# Patient Record
Sex: MALE | Race: WHITE | NOT HISPANIC OR LATINO | Employment: OTHER | URBAN - METROPOLITAN AREA
[De-identification: names, ages, dates, MRNs, and addresses within clinical notes are randomized per-mention and may not be internally consistent; named-entity substitution may affect disease eponyms.]

---

## 2020-01-17 ENCOUNTER — OFFICE VISIT (OUTPATIENT)
Dept: FAMILY MEDICINE CLINIC | Facility: CLINIC | Age: 75
End: 2020-01-17

## 2020-01-17 VITALS
WEIGHT: 138.8 LBS | OXYGEN SATURATION: 98 % | BODY MASS INDEX: 19.43 KG/M2 | SYSTOLIC BLOOD PRESSURE: 138 MMHG | TEMPERATURE: 97.6 F | DIASTOLIC BLOOD PRESSURE: 78 MMHG | HEIGHT: 71 IN | HEART RATE: 64 BPM

## 2020-01-17 DIAGNOSIS — R79.9 ABNORMAL FINDING OF BLOOD CHEMISTRY, UNSPECIFIED: ICD-10-CM

## 2020-01-17 DIAGNOSIS — E55.9 VITAMIN D DEFICIENCY, UNSPECIFIED: ICD-10-CM

## 2020-01-17 DIAGNOSIS — Z00.00 MEDICARE ANNUAL WELLNESS VISIT, SUBSEQUENT: Primary | ICD-10-CM

## 2020-01-17 DIAGNOSIS — Z12.5 ENCOUNTER FOR SCREENING FOR MALIGNANT NEOPLASM OF PROSTATE: ICD-10-CM

## 2020-01-17 DIAGNOSIS — C80.1 CANCER (HCC): ICD-10-CM

## 2020-01-17 DIAGNOSIS — F41.9 ANXIETY: ICD-10-CM

## 2020-01-17 DIAGNOSIS — M19.90 ARTHRITIS: ICD-10-CM

## 2020-01-17 LAB
25(OH)D3 SERPL-MCNC: 47.3 NG/ML (ref 30–100)
ALBUMIN SERPL-MCNC: 4 G/DL (ref 3.5–5.2)
ALBUMIN/GLOB SERPL: 1.4 G/DL
ALP SERPL-CCNC: 68 U/L (ref 39–117)
ALT SERPL W P-5'-P-CCNC: 14 U/L (ref 1–41)
ANION GAP SERPL CALCULATED.3IONS-SCNC: 10.8 MMOL/L (ref 5–15)
AST SERPL-CCNC: 13 U/L (ref 1–40)
BILIRUB SERPL-MCNC: 0.4 MG/DL (ref 0.2–1.2)
BUN BLD-MCNC: 20 MG/DL (ref 8–23)
BUN/CREAT SERPL: 25 (ref 7–25)
CALCIUM SPEC-SCNC: 9.3 MG/DL (ref 8.6–10.5)
CHLORIDE SERPL-SCNC: 102 MMOL/L (ref 98–107)
CHOLEST SERPL-MCNC: 161 MG/DL (ref 0–200)
CHROMATIN AB SERPL-ACNC: 15.2 IU/ML (ref 0–14)
CO2 SERPL-SCNC: 25.2 MMOL/L (ref 22–29)
CREAT BLD-MCNC: 0.8 MG/DL (ref 0.76–1.27)
CRP SERPL-MCNC: 0.05 MG/DL (ref 0–0.5)
DEPRECATED RDW RBC AUTO: 43.3 FL (ref 37–54)
ERYTHROCYTE [DISTWIDTH] IN BLOOD BY AUTOMATED COUNT: 12.7 % (ref 12.3–15.4)
GFR SERPL CREATININE-BSD FRML MDRD: 95 ML/MIN/1.73
GLOBULIN UR ELPH-MCNC: 2.8 GM/DL
GLUCOSE BLD-MCNC: 87 MG/DL (ref 65–99)
HCT VFR BLD AUTO: 34.5 % (ref 37.5–51)
HDLC SERPL-MCNC: 55 MG/DL (ref 40–60)
HGB BLD-MCNC: 11.5 G/DL (ref 13–17.7)
LDLC SERPL CALC-MCNC: 89 MG/DL (ref 0–100)
LDLC/HDLC SERPL: 1.61 {RATIO}
MCH RBC QN AUTO: 31.3 PG (ref 26.6–33)
MCHC RBC AUTO-ENTMCNC: 33.3 G/DL (ref 31.5–35.7)
MCV RBC AUTO: 93.8 FL (ref 79–97)
PLATELET # BLD AUTO: 366 10*3/MM3 (ref 140–450)
PMV BLD AUTO: 9.9 FL (ref 6–12)
POTASSIUM BLD-SCNC: 4.7 MMOL/L (ref 3.5–5.2)
PROT SERPL-MCNC: 6.8 G/DL (ref 6–8.5)
PSA SERPL-MCNC: 0.02 NG/ML (ref 0–4)
RBC # BLD AUTO: 3.68 10*6/MM3 (ref 4.14–5.8)
SODIUM BLD-SCNC: 138 MMOL/L (ref 136–145)
TRIGL SERPL-MCNC: 87 MG/DL (ref 0–150)
VLDLC SERPL-MCNC: 17.4 MG/DL (ref 5–40)
WBC NRBC COR # BLD: 6 10*3/MM3 (ref 3.4–10.8)

## 2020-01-17 PROCEDURE — 82306 VITAMIN D 25 HYDROXY: CPT | Performed by: INTERNAL MEDICINE

## 2020-01-17 PROCEDURE — G0103 PSA SCREENING: HCPCS | Performed by: INTERNAL MEDICINE

## 2020-01-17 PROCEDURE — 80061 LIPID PANEL: CPT | Performed by: INTERNAL MEDICINE

## 2020-01-17 PROCEDURE — 80053 COMPREHEN METABOLIC PANEL: CPT | Performed by: INTERNAL MEDICINE

## 2020-01-17 PROCEDURE — 86140 C-REACTIVE PROTEIN: CPT | Performed by: INTERNAL MEDICINE

## 2020-01-17 PROCEDURE — 99214 OFFICE O/P EST MOD 30 MIN: CPT | Performed by: INTERNAL MEDICINE

## 2020-01-17 PROCEDURE — 85027 COMPLETE CBC AUTOMATED: CPT | Performed by: INTERNAL MEDICINE

## 2020-01-17 PROCEDURE — 86431 RHEUMATOID FACTOR QUANT: CPT | Performed by: INTERNAL MEDICINE

## 2020-01-17 PROCEDURE — 36415 COLL VENOUS BLD VENIPUNCTURE: CPT | Performed by: INTERNAL MEDICINE

## 2020-01-17 PROCEDURE — G0439 PPPS, SUBSEQ VISIT: HCPCS | Performed by: INTERNAL MEDICINE

## 2020-01-17 RX ORDER — ALPRAZOLAM 1 MG/1
1 TABLET ORAL 3 TIMES DAILY PRN
COMMUNITY
End: 2020-05-14 | Stop reason: SDUPTHER

## 2020-01-17 RX ORDER — MELOXICAM 15 MG/1
15 TABLET ORAL DAILY
COMMUNITY
End: 2020-10-30 | Stop reason: SDUPTHER

## 2020-01-17 RX ORDER — SIMVASTATIN 20 MG
20 TABLET ORAL NIGHTLY
COMMUNITY
End: 2020-10-30 | Stop reason: SDUPTHER

## 2020-01-17 RX ORDER — TRIAMCINOLONE ACETONIDE 1 MG/G
CREAM TOPICAL 2 TIMES DAILY
COMMUNITY
End: 2022-02-22 | Stop reason: SDUPTHER

## 2020-01-17 NOTE — PATIENT INSTRUCTIONS
Medicare Wellness  Personal Prevention Plan of Service     Date of Office Visit:  2020  Encounter Provider:  Frank Madison MD  Place of Service:  [facility] AND INTER[facility]  Patient Name: Chaka Paul  :  1945    As part of the Medicare Wellness portion of your visit today, we are providing you with this personalized preventive plan of services (PPPS). This plan is based upon recommendations of the [place] Preventive Services Task Force (USPSTF) and the Advisory Committee on Immunization Practices (ACIP).    This lists the preventive care services that should be considered, and provides dates of when you are due. Items listed as completed are up-to-date and do not require any further intervention.    Health Maintenance   Topic Date Due   • TDAP/TD VACCINES (1 - Tdap) 1958   • ZOSTER VACCINE (1 of 2) 1997   • Pneumococcal Vaccine Once at 65 Years Old  2012   • INFLUENZA VACCINE  2019   • HEPATITIS C SCREENING  2020   • AAA SCREEN (ONE-TIME)  2020   • MEDICARE ANNUAL WELLNESS  2021   • COLONOSCOPY  2023       No orders of the defined types were placed in this encounter.      No follow-ups on file.

## 2020-01-17 NOTE — PROGRESS NOTES
Subsequent Medicare Wellness Visit   The ABC's of the Annual Wellness Visit    Chief Complaint   Patient presents with   • new pcp       HPI:  Chaka Paul, -1945, is a 74 y.o. male who presents for a Subsequent Medicare Wellness Visit.  Patient was seen for a Medicare wellness exam.  Patient was seen for anxiety.  Patient's anxiety is being controlled with his benzodiazepine.  Patient has a history of prostate cancer and his wife has recently developed cancer also.  Patient's anxiety is stable on medication.  Patient also has a low vitamin D level and was advised to take supplemental over-the-counter vitamin D 2000 units daily.  Patient does have arthritis in both hands and feet rheumatoid levels were drawn today.  Patient did have labs done and will follow-up in 2 weeks.    Dictated utilizing Dragon dictation. If there are questions or for further clarification, please contact me.  Recent Hospitalizations:  No hospitalization(s) within the last year..    Current Medical Providers:  Patient Care Team:  Frank Madison MD as PCP - General (Internal Medicine)    Health Habits and Functional and Cognitive Screening and Depression Screening:  Functional & Cognitive Status 2020   Do you have difficulty preparing food and eating? No   Do you have difficulty bathing yourself, getting dressed or grooming yourself? No   Do you have difficulty using the toilet? No   Do you have difficulty moving around from place to place? No   Do you have trouble with steps or getting out of a bed or a chair? No   Current Diet Well Balanced Diet   Dental Exam Up to date   Eye Exam Up to date   Exercise (times per week) 7 times per week   Current Exercise Activities Include Aerobics   Do you need help using the phone?  No   Are you deaf or do you have serious difficulty hearing?  No   Do you need help with transportation? No   Do you need help shopping? No   Do you need help preparing meals?  No   Do you need help with  housework?  No   Do you need help with laundry? No   Do you need help taking your medications? No   Do you need help managing money? No   Do you ever drive or ride in a car without wearing a seat belt? No   Have you felt unusual stress, anger or loneliness in the last month? No   Who do you live with? Spouse   If you need help, do you have trouble finding someone available to you? No   Have you been bothered in the last four weeks by sexual problems? No   Do you have difficulty concentrating, remembering or making decisions? No       Compared to one year ago, the patient feels his physical health is the same and his mental health is the same.    Depression Screen:  PHQ-2/PHQ-9 Depression Screening 1/17/2020   Little interest or pleasure in doing things 0   Feeling down, depressed, or hopeless 0   Trouble falling or staying asleep, or sleeping too much 0   Feeling tired or having little energy 0   Poor appetite or overeating 0   Feeling bad about yourself - or that you are a failure or have let yourself or your family down 0   Trouble concentrating on things, such as reading the newspaper or watching television 0   Moving or speaking so slowly that other people could have noticed. Or the opposite - being so fidgety or restless that you have been moving around a lot more than usual 0   Thoughts that you would be better off dead, or of hurting yourself in some way 0   Total Score 0   If you checked off any problems, how difficult have these problems made it for you to do your work, take care of things at home, or get along with other people? Not difficult at all         Past Medical/Family/Social History:  The following portions of the patient's history were reviewed and updated as appropriate: allergies, current medications, past family history, past medical history, past social history, past surgical history and problem list.    No Known Allergies      Current Outpatient Medications:   •  ALPRAZolam (XANAX) 1 MG  tablet, Take 1 mg by mouth 3 (Three) Times a Day As Needed for Anxiety., Disp: , Rfl:   •  meloxicam (MOBIC) 15 MG tablet, Take 15 mg by mouth Daily., Disp: , Rfl:   •  simvastatin (ZOCOR) 20 MG tablet, Take 20 mg by mouth Every Night., Disp: , Rfl:   •  triamcinolone (KENALOG) 0.1 % cream, Apply  topically to the appropriate area as directed 2 (Two) Times a Day. Rash on arms legs as needed twice daily, Disp: , Rfl:     Aspirin use counseling: Does not need ASA (and currently is not on it)    Current medication list contains no high risk medications.  No harmful drug interactions have been identified.     Family History   Problem Relation Age of Onset   • Dementia Mother    • Other Mother    • Cancer Father    • Blindness Sister    • No Known Problems Brother    • Seizures Sister    • Blindness Brother    • No Known Problems Brother        Social History     Tobacco Use   • Smoking status: Former Smoker   • Tobacco comment: quit 6 years ago november   Substance Use Topics   • Alcohol use: Not Currently     Binge frequency: Never       Past Surgical History:   Procedure Laterality Date   • PROSTATE SURGERY     • TONSILLECTOMY         Patient Active Problem List   Diagnosis   • Visual impairment   • History of medical problems   • Cancer (CMS/HCC)   • Atelectasis   • Arthritis   • Anxiety       Review of Systems   Constitutional: Negative for fatigue and fever.   HENT: Positive for congestion. Negative for trouble swallowing.    Eyes: Negative for discharge and visual disturbance.   Respiratory: Negative for choking and shortness of breath.    Cardiovascular: Negative for chest pain and palpitations.   Gastrointestinal: Negative for abdominal pain and blood in stool.   Endocrine: Negative.    Genitourinary: Negative for genital sores and hematuria.   Musculoskeletal: Negative for gait problem and joint swelling.   Skin: Negative for color change, pallor, rash and wound.   Allergic/Immunologic: Positive for  "environmental allergies. Negative for immunocompromised state.   Neurological: Negative for facial asymmetry and speech difficulty.   Psychiatric/Behavioral: Negative for hallucinations and suicidal ideas. The patient is nervous/anxious.        Objective     Vitals:    01/17/20 1008   BP: 138/78   Pulse: 64   Temp: 97.6 °F (36.4 °C)   SpO2: 98%   Weight: 63 kg (138 lb 12.8 oz)   Height: 180.3 cm (71\")       Patient's Body mass index is 19.36 kg/m². BMI is within normal parameters. No follow-up required..      No exam data present    The patient has no evidence of cognitve impairment.     Physical Exam   Constitutional: He appears well-developed and well-nourished.   HENT:   Head: Normocephalic.   Right Ear: External ear normal.   Left Ear: External ear normal.   Nose: Nose normal.   Mouth/Throat: Oropharynx is clear and moist.   Eyes: Pupils are equal, round, and reactive to light. Conjunctivae and EOM are normal.   Neck: Normal range of motion. Neck supple.   Cardiovascular: Normal rate, regular rhythm, normal heart sounds and intact distal pulses.   Pulmonary/Chest: Breath sounds normal.   Abdominal: Soft. Bowel sounds are normal.   Genitourinary: Rectum normal, prostate normal and penis normal.   Musculoskeletal: Normal range of motion.   Neurological: He is alert.   Skin: Skin is warm and dry.   Psychiatric: His behavior is normal. Judgment and thought content normal.   Patient has a moderate level of anxiety well controlled with medication       Recent Lab Results:          Assessment/Plan   Age-appropriate Screening Schedule:  Refer to the list below for future screening recommendations based on patient's age, sex and/or medical conditions.      Health Maintenance   Topic Date Due   • TDAP/TD VACCINES (1 - Tdap) 02/28/1958   • ZOSTER VACCINE (1 of 2) 02/28/1997   • INFLUENZA VACCINE  08/01/2019   • COLONOSCOPY  09/05/2023       Medicare Risks and Personalized Health Plan:  Advance Directive " Discussion      CMS-Preventive Services Quick Reference  Medicare Preventive Services Addressed:  NA    Advance Care Planning: #1 labs #2 vitamin D 2000 units daily #3 follow-up in 2 weeks for check blood pressure at home and bring to clinic  Patient does not have an advance directive - information provided to the patient today    Diagnoses and all orders for this visit:    1. Medicare annual wellness visit, subsequent (Primary)    2. Arthritis  -     CBC (No Diff)  -     Comprehensive Metabolic Panel  -     Lipid Panel  -     Vitamin D 25 Hydroxy  -     PSA Screen  -     KISHORE  -     C-reactive Protein  -     Rheumatoid Factor    3. Cancer (CMS/HCC)  -     CBC (No Diff)  -     Comprehensive Metabolic Panel  -     Lipid Panel  -     Vitamin D 25 Hydroxy  -     PSA Screen    4. Anxiety  -     CBC (No Diff)  -     Comprehensive Metabolic Panel  -     Lipid Panel  -     Vitamin D 25 Hydroxy  -     PSA Screen    5. Abnormal finding of blood chemistry, unspecified   -     Lipid Panel    6. Vitamin D deficiency, unspecified   -     Vitamin D 25 Hydroxy    7. Encounter for screening for malignant neoplasm of prostate   -     PSA Screen        An After Visit Summary and PPPS with all of these plans were given to the patient.      Follow Up:  Return in about 2 weeks (around 1/31/2020), or if symptoms worsen or fail to improve, for Recheck.

## 2020-01-20 DIAGNOSIS — M19.90 ARTHRITIS: Primary | ICD-10-CM

## 2020-01-20 LAB — ANA SER QL: NEGATIVE

## 2020-01-31 ENCOUNTER — OFFICE VISIT (OUTPATIENT)
Dept: FAMILY MEDICINE CLINIC | Facility: CLINIC | Age: 75
End: 2020-01-31

## 2020-01-31 VITALS
SYSTOLIC BLOOD PRESSURE: 138 MMHG | HEART RATE: 70 BPM | OXYGEN SATURATION: 97 % | BODY MASS INDEX: 19.29 KG/M2 | DIASTOLIC BLOOD PRESSURE: 68 MMHG | HEIGHT: 71 IN | WEIGHT: 137.8 LBS | TEMPERATURE: 97.5 F

## 2020-01-31 DIAGNOSIS — J98.11 ATELECTASIS: ICD-10-CM

## 2020-01-31 DIAGNOSIS — C80.1 CANCER (HCC): ICD-10-CM

## 2020-01-31 DIAGNOSIS — M19.90 ARTHRITIS: Primary | ICD-10-CM

## 2020-05-14 RX ORDER — ALPRAZOLAM 1 MG/1
1 TABLET ORAL 3 TIMES DAILY PRN
Qty: 90 TABLET | Refills: 3 | Status: SHIPPED | OUTPATIENT
Start: 2020-05-14 | End: 2020-09-14 | Stop reason: SDUPTHER

## 2020-07-24 ENCOUNTER — LAB (OUTPATIENT)
Dept: FAMILY MEDICINE CLINIC | Facility: CLINIC | Age: 75
End: 2020-07-24

## 2020-07-24 DIAGNOSIS — E55.9 VITAMIN D DEFICIENCY, UNSPECIFIED: ICD-10-CM

## 2020-07-24 DIAGNOSIS — C80.1 CANCER (HCC): ICD-10-CM

## 2020-07-24 DIAGNOSIS — M19.90 ARTHRITIS: ICD-10-CM

## 2020-07-24 DIAGNOSIS — E55.9 VITAMIN D DEFICIENCY, UNSPECIFIED: Primary | ICD-10-CM

## 2020-07-24 DIAGNOSIS — R93.3 ABNORMAL FINDINGS ON DIAGNOSTIC IMAGING OF OTHER PARTS OF DIGESTIVE TRACT: ICD-10-CM

## 2020-07-24 LAB
25(OH)D3 SERPL-MCNC: 67 NG/ML (ref 30–100)
ALBUMIN SERPL-MCNC: 4.1 G/DL (ref 3.5–5.2)
ALBUMIN/GLOB SERPL: 1.5 G/DL
ALP SERPL-CCNC: 66 U/L (ref 39–117)
ALT SERPL W P-5'-P-CCNC: 17 U/L (ref 1–41)
ANION GAP SERPL CALCULATED.3IONS-SCNC: 8.5 MMOL/L (ref 5–15)
AST SERPL-CCNC: 17 U/L (ref 1–40)
BILIRUB SERPL-MCNC: 0.3 MG/DL (ref 0–1.2)
BUN SERPL-MCNC: 18 MG/DL (ref 8–23)
BUN/CREAT SERPL: 16.2 (ref 7–25)
CALCIUM SPEC-SCNC: 9.8 MG/DL (ref 8.6–10.5)
CHLORIDE SERPL-SCNC: 104 MMOL/L (ref 98–107)
CHOLEST SERPL-MCNC: 180 MG/DL (ref 0–200)
CO2 SERPL-SCNC: 25.5 MMOL/L (ref 22–29)
CREAT SERPL-MCNC: 1.11 MG/DL (ref 0.76–1.27)
DEPRECATED RDW RBC AUTO: 49.4 FL (ref 37–54)
ERYTHROCYTE [DISTWIDTH] IN BLOOD BY AUTOMATED COUNT: 13.2 % (ref 12.3–15.4)
GFR SERPL CREATININE-BSD FRML MDRD: 65 ML/MIN/1.73
GLOBULIN UR ELPH-MCNC: 2.8 GM/DL
GLUCOSE SERPL-MCNC: 91 MG/DL (ref 65–99)
HCT VFR BLD AUTO: 39.7 % (ref 37.5–51)
HDLC SERPL-MCNC: 43 MG/DL (ref 40–60)
HGB BLD-MCNC: 12.7 G/DL (ref 13–17.7)
LDLC SERPL CALC-MCNC: 103 MG/DL (ref 0–100)
LDLC/HDLC SERPL: 2.4 {RATIO}
MCH RBC QN AUTO: 31.9 PG (ref 26.6–33)
MCHC RBC AUTO-ENTMCNC: 32 G/DL (ref 31.5–35.7)
MCV RBC AUTO: 99.7 FL (ref 79–97)
PLATELET # BLD AUTO: 263 10*3/MM3 (ref 140–450)
PMV BLD AUTO: 10.8 FL (ref 6–12)
POTASSIUM SERPL-SCNC: 5.1 MMOL/L (ref 3.5–5.2)
PROT SERPL-MCNC: 6.9 G/DL (ref 6–8.5)
RBC # BLD AUTO: 3.98 10*6/MM3 (ref 4.14–5.8)
SODIUM SERPL-SCNC: 138 MMOL/L (ref 136–145)
TRIGL SERPL-MCNC: 168 MG/DL (ref 0–150)
VLDLC SERPL-MCNC: 33.6 MG/DL (ref 5–40)
WBC # BLD AUTO: 5.66 10*3/MM3 (ref 3.4–10.8)

## 2020-07-24 PROCEDURE — 85027 COMPLETE CBC AUTOMATED: CPT | Performed by: INTERNAL MEDICINE

## 2020-07-24 PROCEDURE — 80061 LIPID PANEL: CPT | Performed by: INTERNAL MEDICINE

## 2020-07-24 PROCEDURE — 80053 COMPREHEN METABOLIC PANEL: CPT | Performed by: INTERNAL MEDICINE

## 2020-07-24 PROCEDURE — 82306 VITAMIN D 25 HYDROXY: CPT | Performed by: INTERNAL MEDICINE

## 2020-07-24 PROCEDURE — 36415 COLL VENOUS BLD VENIPUNCTURE: CPT | Performed by: INTERNAL MEDICINE

## 2020-07-31 ENCOUNTER — OFFICE VISIT (OUTPATIENT)
Dept: FAMILY MEDICINE CLINIC | Facility: CLINIC | Age: 75
End: 2020-07-31

## 2020-07-31 VITALS
OXYGEN SATURATION: 99 % | TEMPERATURE: 97.8 F | DIASTOLIC BLOOD PRESSURE: 62 MMHG | HEART RATE: 58 BPM | WEIGHT: 142 LBS | HEIGHT: 71 IN | BODY MASS INDEX: 19.88 KG/M2 | SYSTOLIC BLOOD PRESSURE: 120 MMHG

## 2020-07-31 DIAGNOSIS — F41.9 ANXIETY: ICD-10-CM

## 2020-07-31 DIAGNOSIS — E78.2 MIXED HYPERLIPIDEMIA: Primary | ICD-10-CM

## 2020-07-31 DIAGNOSIS — M25.562 LEFT KNEE PAIN, UNSPECIFIED CHRONICITY: ICD-10-CM

## 2020-07-31 PROBLEM — M15.9 OSTEOARTHRITIS OF MULTIPLE JOINTS: Status: ACTIVE | Noted: 2020-07-31

## 2020-07-31 PROCEDURE — 99214 OFFICE O/P EST MOD 30 MIN: CPT | Performed by: INTERNAL MEDICINE

## 2020-07-31 NOTE — PROGRESS NOTES
Subjective   Chaka Paul is a 75 y.o. male.     History of Present Illness   Patient was seen for hyperlipidemia.  Triglycerides 168, HDL 43, .  Patient is taking statin and does exercise 30 minutes 3-4 times a week.  Patient anxiety has been well controlled with his Xanax.  Patient takes 1 mg of Xanax at bedtime.  Patient did develop left knee pain.  The pain is mild to moderate and lasts less than 30 minutes.  Patient does not need to take any medication for it.  Patient 15 years ago hurt his left knee from a ladder accident.  Patient did not want a knee x-ray was advised to use ice packs when it bothered him.    Dictated utilizing Dragon dictation. If there are questions or for further clarification, please contact me.  The following portions of the patient's history were reviewed and updated as appropriate: allergies, current medications, past family history, past medical history, past social history, past surgical history and problem list.    Review of Systems   Constitutional: Negative for fatigue and fever.   HENT: Positive for congestion. Negative for trouble swallowing.    Eyes: Negative for discharge and visual disturbance.   Respiratory: Negative for choking and shortness of breath.    Cardiovascular: Negative for chest pain and palpitations.   Gastrointestinal: Negative for abdominal pain and blood in stool.   Endocrine: Negative.    Genitourinary: Negative for genital sores and hematuria.   Musculoskeletal: Positive for arthralgias. Negative for gait problem and joint swelling.        Left knee pain   Skin: Negative for color change, pallor, rash and wound.   Allergic/Immunologic: Positive for environmental allergies. Negative for immunocompromised state.   Neurological: Negative for facial asymmetry and speech difficulty.   Psychiatric/Behavioral: Negative for hallucinations and suicidal ideas.       Objective   Physical Exam   Constitutional: He is oriented to person, place, and time. He  appears well-developed and well-nourished.   HENT:   Head: Normocephalic and atraumatic.   Eyes: Pupils are equal, round, and reactive to light. Conjunctivae and EOM are normal.   Neck: Normal range of motion. Neck supple.   Cardiovascular: Normal rate, regular rhythm and normal heart sounds. Exam reveals no gallop and no friction rub.   No murmur heard.  Pulmonary/Chest: Effort normal and breath sounds normal. No stridor. No respiratory distress. He has no wheezes. He has no rales. He exhibits no tenderness.   Abdominal: Soft. Bowel sounds are normal.   Musculoskeletal: Normal range of motion. He exhibits no edema, tenderness or deformity.   Neurological: He is alert and oriented to person, place, and time.   Skin: Skin is warm and dry.   Psychiatric: He has a normal mood and affect. His behavior is normal. Judgment and thought content normal.   Nursing note and vitals reviewed.      Assessment/Plan #1 knee x-ray #2 ice packs #3 follow-up in 6 months  Chaka was seen today for follow-up and lab results.    Diagnoses and all orders for this visit:    Mixed hyperlipidemia    Left knee pain, unspecified chronicity    Anxiety

## 2020-09-14 RX ORDER — ALPRAZOLAM 1 MG/1
1 TABLET ORAL 3 TIMES DAILY PRN
Qty: 90 TABLET | Refills: 3 | Status: SHIPPED | OUTPATIENT
Start: 2020-09-14 | End: 2020-09-18 | Stop reason: SDUPTHER

## 2020-09-18 RX ORDER — ALPRAZOLAM 1 MG/1
1 TABLET ORAL 3 TIMES DAILY PRN
Qty: 90 TABLET | Refills: 3 | Status: SHIPPED | OUTPATIENT
Start: 2020-09-18 | End: 2020-10-29 | Stop reason: SDUPTHER

## 2020-10-29 ENCOUNTER — TELEPHONE (OUTPATIENT)
Dept: FAMILY MEDICINE CLINIC | Facility: CLINIC | Age: 75
End: 2020-10-29

## 2020-10-29 RX ORDER — ALPRAZOLAM 1 MG/1
1 TABLET ORAL 3 TIMES DAILY PRN
Qty: 90 TABLET | Refills: 3 | Status: SHIPPED | OUTPATIENT
Start: 2020-10-29 | End: 2020-10-30 | Stop reason: SDUPTHER

## 2020-10-29 NOTE — TELEPHONE ENCOUNTER
PATIENT'S WIFE IS CALLING TO GET A REFILL OF:      simvastatin (ZOCOR) 20 MG tablet    HE IS COMPLETELY OUT.    ALPRAZolam (XANAX) 1 MG tablet    HE HAS 2 DAYS SUPPLY LEFT.      HE IS USING THE [facility] ON [address] NOW.    BEST PH#: [phone]

## 2020-10-30 ENCOUNTER — TELEPHONE (OUTPATIENT)
Dept: FAMILY MEDICINE CLINIC | Facility: CLINIC | Age: 75
End: 2020-10-30

## 2020-10-30 RX ORDER — SIMVASTATIN 20 MG
20 TABLET ORAL NIGHTLY
Status: CANCELLED | OUTPATIENT
Start: 2020-10-30

## 2020-10-30 RX ORDER — MELOXICAM 15 MG/1
15 TABLET ORAL DAILY
Qty: 90 TABLET | Refills: 1 | Status: SHIPPED | OUTPATIENT
Start: 2020-10-30 | End: 2021-03-08

## 2020-10-30 RX ORDER — ALPRAZOLAM 1 MG/1
1 TABLET ORAL 3 TIMES DAILY PRN
Qty: 90 TABLET | Refills: 3 | Status: SHIPPED | OUTPATIENT
Start: 2020-10-30 | End: 2021-04-12

## 2020-10-30 RX ORDER — ALPRAZOLAM 1 MG/1
1 TABLET ORAL 3 TIMES DAILY PRN
Qty: 90 TABLET | Refills: 3 | Status: CANCELLED | OUTPATIENT
Start: 2020-10-30

## 2020-10-30 RX ORDER — MELOXICAM 15 MG/1
15 TABLET ORAL DAILY
Status: CANCELLED | OUTPATIENT
Start: 2020-10-30

## 2020-10-30 RX ORDER — SIMVASTATIN 20 MG
20 TABLET ORAL NIGHTLY
Qty: 90 TABLET | Refills: 1 | Status: SHIPPED | OUTPATIENT
Start: 2020-10-30 | End: 2021-07-19

## 2020-10-30 NOTE — TELEPHONE ENCOUNTER
Caller: SAMAYOACONSTANZA    Relationship: Emergency Contact    Best call back number: [phone]   Medication needed:   Requested Prescriptions     Pending Prescriptions Disp Refills   • meloxicam (MOBIC) 15 MG tablet        Sig: Take 1 tablet by mouth Daily.   • ALPRAZolam (XANAX) 1 MG tablet 90 tablet 3     Sig: Take 1 tablet by mouth 3 (Three) Times a Day As Needed for Anxiety.   • simvastatin (ZOCOR) 20 MG tablet        Sig: Take 1 tablet by mouth Every Night.       When do you need the refill by:TODAY   What details did the patient provide when requesting the medication:  OUT OF MEDICATION  Does the patient have less than a 3 day supply:  [x] Yes  [] No    What is the patient's preferred pharmacy: [id number] - [facility] [address] AT [facility] & [facility] [phone] [facility] [phone] FX

## 2021-03-08 RX ORDER — MELOXICAM 15 MG/1
15 TABLET ORAL DAILY
Qty: 90 TABLET | Refills: 1 | Status: SHIPPED | OUTPATIENT
Start: 2021-03-08 | End: 2021-08-20

## 2021-03-09 DIAGNOSIS — Z23 IMMUNIZATION DUE: ICD-10-CM

## 2021-03-19 ENCOUNTER — OFFICE VISIT (OUTPATIENT)
Dept: FAMILY MEDICINE CLINIC | Facility: CLINIC | Age: 76
End: 2021-03-19

## 2021-03-19 DIAGNOSIS — Z12.5 ENCOUNTER FOR SCREENING FOR MALIGNANT NEOPLASM OF PROSTATE: ICD-10-CM

## 2021-03-19 DIAGNOSIS — E78.2 MIXED HYPERLIPIDEMIA: ICD-10-CM

## 2021-03-19 DIAGNOSIS — E55.9 VITAMIN D DEFICIENCY, UNSPECIFIED: ICD-10-CM

## 2021-03-19 DIAGNOSIS — F41.9 ANXIETY: Primary | ICD-10-CM

## 2021-03-19 PROCEDURE — 99443 PR PHYS/QHP TELEPHONE EVALUATION 21-30 MIN: CPT | Performed by: INTERNAL MEDICINE

## 2021-03-19 NOTE — PROGRESS NOTES
Subjective   Chaka Paul is a 76 y.o. male.     There were no vitals filed for this visit.   There is no height or weight on file to calculate BMI.     History of Present Illness   You have chosen to receive care through a telephone visit. Do you consent to use a telephone visit for your medical care today? Yes  A 24-minute phone encounter.  Patient was seen for anxiety.  Patient is taking Xanax 1 mg 3 times daily.  Patient states his anxiety been well controlled with this medication.  Patient's lipids are being treated with diet exercise and simvastatin 20 mg daily.  Triglycerides 168, HDL 43, .  Patient will continue present diet and activity levels.  Patient's vitamin D levels being treated with over-the-counter D3.  Patient's vitamin D level was 67.  Patient is continuing his present diet and activity levels patient will have labs drawn.  Patient will follow up in 4 months.    Dictated utilizing Dragon dictation. If there are questions or for further clarification, please contact me.  The following portions of the patient's history were reviewed and updated as appropriate: allergies, current medications, past family history, past medical history, past social history, past surgical history and problem list.    Review of Systems   Constitutional: Negative for fatigue and fever.   HENT: Positive for congestion. Negative for trouble swallowing.    Eyes: Negative for discharge and visual disturbance.   Respiratory: Negative for choking and shortness of breath.    Cardiovascular: Negative for chest pain and palpitations.   Gastrointestinal: Negative for abdominal pain and blood in stool.   Endocrine: Negative.    Genitourinary: Negative for genital sores and hematuria.   Musculoskeletal: Negative for gait problem and joint swelling.   Skin: Negative for color change, pallor, rash and wound.   Allergic/Immunologic: Positive for environmental allergies. Negative for immunocompromised state.   Neurological:  Negative for facial asymmetry and speech difficulty.   Psychiatric/Behavioral: Negative for hallucinations and suicidal ideas.       Objective   Physical Exam  Pulmonary:      Effort: Pulmonary effort is normal.      Breath sounds: Normal breath sounds.   Neurological:      General: No focal deficit present.      Mental Status: He is oriented to person, place, and time. Mental status is at baseline.   Psychiatric:         Mood and Affect: Mood normal.         Behavior: Behavior normal.         Thought Content: Thought content normal.         Judgment: Judgment normal.         Assessment/Plan #1 labs #2 continue anxiety treatment #3 continue present diet and activity levels #4 continue simvastatin 20 mg daily.  Problems Addressed this Visit     Cardiac and Vasculature            Mixed hyperlipidemia    Relevant Orders    CBC (No Diff)    Comprehensive Metabolic Panel    Lipid Panel    Vitamin D 25 Hydroxy    PSA Screen          Mental Health            Anxiety - Primary    Relevant Orders    CBC (No Diff)    Comprehensive Metabolic Panel    Lipid Panel    Vitamin D 25 Hydroxy    PSA Screen            Other Visit Diagnoses     Vitamin D deficiency, unspecified        Relevant Orders    CBC (No Diff)    Comprehensive Metabolic Panel    Lipid Panel    Vitamin D 25 Hydroxy    PSA Screen    Encounter for screening for malignant neoplasm of prostate         Relevant Orders    PSA Screen      Diagnoses     Diagnosis Codes Comments    Anxiety    -  Primary ICD-10-CM: F41.9  ICD-9-CM: 300.00     Mixed hyperlipidemia     ICD-10-CM: E78.2  ICD-9-CM: 272.2     Vitamin D deficiency, unspecified     ICD-10-CM: E55.9  ICD-9-CM: 268.9     Encounter for screening for malignant neoplasm of prostate      ICD-10-CM: Z12.5  ICD-9-CM: V76.44

## 2021-04-12 RX ORDER — ALPRAZOLAM 1 MG/1
TABLET ORAL
Qty: 90 TABLET | Refills: 3 | Status: SHIPPED | OUTPATIENT
Start: 2021-04-12 | End: 2021-08-27 | Stop reason: SDUPTHER

## 2021-07-19 RX ORDER — SIMVASTATIN 20 MG
20 TABLET ORAL NIGHTLY
Qty: 90 TABLET | Refills: 1 | Status: SHIPPED | OUTPATIENT
Start: 2021-07-19 | End: 2021-10-25

## 2021-08-20 RX ORDER — MELOXICAM 15 MG/1
15 TABLET ORAL DAILY
Qty: 90 TABLET | Refills: 1 | Status: SHIPPED | OUTPATIENT
Start: 2021-08-20 | End: 2022-04-27 | Stop reason: SDUPTHER

## 2021-08-27 RX ORDER — ALPRAZOLAM 1 MG/1
1 TABLET ORAL 3 TIMES DAILY PRN
Qty: 90 TABLET | Refills: 3 | Status: SHIPPED | OUTPATIENT
Start: 2021-08-27 | End: 2021-12-28 | Stop reason: SDUPTHER

## 2021-09-17 ENCOUNTER — OFFICE VISIT (OUTPATIENT)
Dept: FAMILY MEDICINE CLINIC | Facility: CLINIC | Age: 76
End: 2021-09-17

## 2021-09-17 DIAGNOSIS — F41.9 ANXIETY: Primary | ICD-10-CM

## 2021-09-17 DIAGNOSIS — Z12.5 PROSTATE CANCER SCREENING: ICD-10-CM

## 2021-09-17 DIAGNOSIS — E55.9 VITAMIN D DEFICIENCY, UNSPECIFIED: ICD-10-CM

## 2021-09-17 DIAGNOSIS — E78.2 MIXED HYPERLIPIDEMIA: ICD-10-CM

## 2021-09-17 PROCEDURE — 99443 PR PHYS/QHP TELEPHONE EVALUATION 21-30 MIN: CPT | Performed by: INTERNAL MEDICINE

## 2021-09-17 NOTE — PROGRESS NOTES
Subjective   Chaka Paul is a 76 y.o. male.     There were no vitals filed for this visit.   There is no height or weight on file to calculate BMI.     History of Present Illness   Patient was seen for a 22-minute phone encounter.  Patient was calling about anxiety.  Patient is very anxious is using appraisal Lamb 1 mg 3 times daily as needed.  Patient states this helps his symptoms and wants to stay on it.  Patient does have hyperlipidemia.  He uses diet exercise and simvastatin 20 mg daily.  Triglycerides were 168, HDL 43, .  This was done last year.  Patient has not been getting out because of Covid.  Patient was advised to redo labs and follow-up in 4 months.  Patient is also continue his present diet and activity levels.  Patient does have a vitamin D3 deficiency is being supplemented with over-the-counter D3.  Patient did have labs ordered and advised to get them as soon as possible.    Dictated utilizing Dragon dictation. If there are questions or for further clarification, please contact me.  The following portions of the patient's history were reviewed and updated as appropriate: allergies, current medications, past family history, past medical history, past social history, past surgical history and problem list.    Review of Systems   Constitutional: Negative for fatigue and fever.   HENT: Positive for congestion. Negative for trouble swallowing.    Eyes: Negative for discharge and visual disturbance.   Respiratory: Negative for choking and shortness of breath.    Cardiovascular: Negative for chest pain and palpitations.   Gastrointestinal: Negative for abdominal pain and blood in stool.   Endocrine: Negative.    Genitourinary: Negative for genital sores and hematuria.   Musculoskeletal: Negative for gait problem and joint swelling.   Skin: Negative for color change, pallor, rash and wound.   Allergic/Immunologic: Positive for environmental allergies. Negative for immunocompromised state.    Neurological: Negative for facial asymmetry and speech difficulty.   Psychiatric/Behavioral: Negative for hallucinations and suicidal ideas. The patient is nervous/anxious.        Objective   Physical Exam  Pulmonary:      Effort: Pulmonary effort is normal.      Breath sounds: Normal breath sounds.   Neurological:      General: No focal deficit present.      Mental Status: He is oriented to person, place, and time. Mental status is at baseline.   Psychiatric:         Behavior: Behavior normal.         Thought Content: Thought content normal.         Judgment: Judgment normal.      Comments: Patient has moderate anxiety         Assessment/Plan 1 continue present diet and activity levels #2 labs #3 follow-up in 4 months  Problems Addressed this Visit     Cardiac and Vasculature            Mixed hyperlipidemia    Relevant Orders    CBC (No Diff)    Comprehensive Metabolic Panel    Lipid Panel    Vitamin D 25 Hydroxy          Mental Health            Anxiety - Primary    Relevant Orders    CBC (No Diff)    Comprehensive Metabolic Panel    Lipid Panel    Vitamin D 25 Hydroxy            Other Visit Diagnoses     Vitamin D deficiency, unspecified        Relevant Orders    CBC (No Diff)    Comprehensive Metabolic Panel    Lipid Panel    Vitamin D 25 Hydroxy    Prostate cancer screening        Relevant Orders    PSA Screen      Diagnoses     Diagnosis Codes Comments    Anxiety    -  Primary ICD-10-CM: F41.9  ICD-9-CM: 300.00     Vitamin D deficiency, unspecified     ICD-10-CM: E55.9  ICD-9-CM: 268.9     Mixed hyperlipidemia     ICD-10-CM: E78.2  ICD-9-CM: 272.2     Prostate cancer screening     ICD-10-CM: Z12.5  ICD-9-CM: V76.44

## 2021-10-11 ENCOUNTER — LAB (OUTPATIENT)
Dept: FAMILY MEDICINE CLINIC | Facility: CLINIC | Age: 76
End: 2021-10-11

## 2021-10-11 DIAGNOSIS — Z12.5 PROSTATE CANCER SCREENING: ICD-10-CM

## 2021-10-11 DIAGNOSIS — E78.2 MIXED HYPERLIPIDEMIA: ICD-10-CM

## 2021-10-11 DIAGNOSIS — F41.9 ANXIETY: ICD-10-CM

## 2021-10-11 DIAGNOSIS — E55.9 VITAMIN D DEFICIENCY, UNSPECIFIED: ICD-10-CM

## 2021-10-11 LAB
25(OH)D3 SERPL-MCNC: 77.9 NG/ML (ref 30–100)
ALBUMIN SERPL-MCNC: 4.7 G/DL (ref 3.5–5.2)
ALBUMIN/GLOB SERPL: 1.7 G/DL
ALP SERPL-CCNC: 74 U/L (ref 39–117)
ALT SERPL W P-5'-P-CCNC: 19 U/L (ref 1–41)
ANION GAP SERPL CALCULATED.3IONS-SCNC: 11.3 MMOL/L (ref 5–15)
AST SERPL-CCNC: 15 U/L (ref 1–40)
BILIRUB SERPL-MCNC: 0.6 MG/DL (ref 0–1.2)
BUN SERPL-MCNC: 25 MG/DL (ref 8–23)
BUN/CREAT SERPL: 20.8 (ref 7–25)
CALCIUM SPEC-SCNC: 10.2 MG/DL (ref 8.6–10.5)
CHLORIDE SERPL-SCNC: 101 MMOL/L (ref 98–107)
CHOLEST SERPL-MCNC: 182 MG/DL (ref 0–200)
CO2 SERPL-SCNC: 23.7 MMOL/L (ref 22–29)
CREAT SERPL-MCNC: 1.2 MG/DL (ref 0.76–1.27)
DEPRECATED RDW RBC AUTO: 44.9 FL (ref 37–54)
ERYTHROCYTE [DISTWIDTH] IN BLOOD BY AUTOMATED COUNT: 12.6 % (ref 12.3–15.4)
GFR SERPL CREATININE-BSD FRML MDRD: 59 ML/MIN/1.73
GLOBULIN UR ELPH-MCNC: 2.7 GM/DL
GLUCOSE SERPL-MCNC: 86 MG/DL (ref 65–99)
HCT VFR BLD AUTO: 42.3 % (ref 37.5–51)
HDLC SERPL-MCNC: 48 MG/DL (ref 40–60)
HGB BLD-MCNC: 13.8 G/DL (ref 13–17.7)
LDLC SERPL CALC-MCNC: 111 MG/DL (ref 0–100)
LDLC/HDLC SERPL: 2.26 {RATIO}
MCH RBC QN AUTO: 31.4 PG (ref 26.6–33)
MCHC RBC AUTO-ENTMCNC: 32.6 G/DL (ref 31.5–35.7)
MCV RBC AUTO: 96.1 FL (ref 79–97)
PLATELET # BLD AUTO: 315 10*3/MM3 (ref 140–450)
PMV BLD AUTO: 10.1 FL (ref 6–12)
POTASSIUM SERPL-SCNC: 5 MMOL/L (ref 3.5–5.2)
PROT SERPL-MCNC: 7.4 G/DL (ref 6–8.5)
PSA SERPL-MCNC: 0.07 NG/ML (ref 0–4)
RBC # BLD AUTO: 4.4 10*6/MM3 (ref 4.14–5.8)
SODIUM SERPL-SCNC: 136 MMOL/L (ref 136–145)
TRIGL SERPL-MCNC: 128 MG/DL (ref 0–150)
VLDLC SERPL-MCNC: 23 MG/DL (ref 5–40)
WBC # BLD AUTO: 6.14 10*3/MM3 (ref 3.4–10.8)

## 2021-10-11 PROCEDURE — 82306 VITAMIN D 25 HYDROXY: CPT | Performed by: INTERNAL MEDICINE

## 2021-10-11 PROCEDURE — G0103 PSA SCREENING: HCPCS | Performed by: INTERNAL MEDICINE

## 2021-10-11 PROCEDURE — 80053 COMPREHEN METABOLIC PANEL: CPT | Performed by: INTERNAL MEDICINE

## 2021-10-11 PROCEDURE — 85027 COMPLETE CBC AUTOMATED: CPT | Performed by: INTERNAL MEDICINE

## 2021-10-11 PROCEDURE — 36415 COLL VENOUS BLD VENIPUNCTURE: CPT | Performed by: INTERNAL MEDICINE

## 2021-10-11 PROCEDURE — 80061 LIPID PANEL: CPT | Performed by: INTERNAL MEDICINE

## 2021-10-25 RX ORDER — SIMVASTATIN 20 MG
20 TABLET ORAL NIGHTLY
Qty: 90 TABLET | Refills: 1 | Status: SHIPPED | OUTPATIENT
Start: 2021-10-25 | End: 2022-08-22

## 2021-12-28 RX ORDER — ALPRAZOLAM 1 MG/1
1 TABLET ORAL 3 TIMES DAILY PRN
Qty: 90 TABLET | Refills: 3 | Status: SHIPPED | OUTPATIENT
Start: 2021-12-28 | End: 2022-04-29

## 2021-12-28 NOTE — TELEPHONE ENCOUNTER
Caller: CONSTANZA SAMAYOA    Relationship: Emergency Contact    Best call back number: [phone]     Requested Prescriptions:   Requested Prescriptions     Pending Prescriptions Disp Refills   • ALPRAZolam (XANAX) 1 MG tablet 90 tablet 3     Sig: Take 1 tablet by mouth 3 (Three) Times a Day As Needed for Anxiety. for anxiety        Pharmacy where request should be sent: [facility] #[id number] - [facility] [address] AT [facility] & [facility] [phone] [facility] [phone] FX     Additional details provided by patient: PATIENT HAS ABOUT 3 DAYS LEFT WAS NOT SURE IF NEEDED APPT CAN YOU PLEASE CALL AND ADVISE IF HE DOES.    Does the patient have less than a 3 day supply:  [x] Yes  [] No    Paige Romero, PCT   12/28/21 11:33 EST

## 2022-02-22 ENCOUNTER — OFFICE VISIT (OUTPATIENT)
Dept: FAMILY MEDICINE CLINIC | Facility: CLINIC | Age: 77
End: 2022-02-22

## 2022-02-22 VITALS
DIASTOLIC BLOOD PRESSURE: 70 MMHG | HEART RATE: 77 BPM | HEIGHT: 71 IN | TEMPERATURE: 98.2 F | SYSTOLIC BLOOD PRESSURE: 110 MMHG | WEIGHT: 142.8 LBS | OXYGEN SATURATION: 98 % | BODY MASS INDEX: 19.99 KG/M2

## 2022-02-22 DIAGNOSIS — R21 RASH: Primary | ICD-10-CM

## 2022-02-22 PROBLEM — C61 PROSTATE CA: Status: ACTIVE | Noted: 2022-02-22

## 2022-02-22 PROCEDURE — 99212 OFFICE O/P EST SF 10 MIN: CPT | Performed by: INTERNAL MEDICINE

## 2022-02-22 RX ORDER — TRIAMCINOLONE ACETONIDE 1 MG/G
CREAM TOPICAL 2 TIMES DAILY
Qty: 28.4 G | Refills: 1 | Status: SHIPPED | OUTPATIENT
Start: 2022-02-22 | End: 2022-06-23 | Stop reason: SDUPTHER

## 2022-02-22 NOTE — PROGRESS NOTES
Subjective   Chaka Paul is a 76 y.o. male.     Vitals:    02/22/22 1317   BP: 110/70   Pulse: 77   Temp: 98.2 °F (36.8 °C)   SpO2: 98%      Body mass index is 19.93 kg/m².     History of Present Illness   Patient was seen for a petechial rash.  Rash occurred across his anterior surface of his left leg.  Patient has had this recur over the past several years.  Patient will use the Cream and Then It Would Come Back Several Months Later.  Patient Was Given Triamcinolone and Referred to Dermatology.    Dictated utilizing Dragon dictation. If there are questions or for further clarification, please contact me.  The following portions of the patient's history were reviewed and updated as appropriate: allergies, current medications, past family history, past medical history, past social history, past surgical history and problem list.    Review of Systems   Constitutional: Negative for fatigue and fever.   HENT: Positive for congestion. Negative for trouble swallowing.    Eyes: Negative for discharge and visual disturbance.   Respiratory: Negative for choking and shortness of breath.    Cardiovascular: Negative for chest pain and palpitations.   Gastrointestinal: Negative for abdominal pain and blood in stool.   Endocrine: Negative.    Genitourinary: Negative for genital sores and hematuria.   Musculoskeletal: Negative for gait problem and joint swelling.   Skin: Positive for rash. Negative for color change, pallor and wound.   Allergic/Immunologic: Positive for environmental allergies. Negative for immunocompromised state.   Neurological: Negative for facial asymmetry and speech difficulty.   Psychiatric/Behavioral: Negative for hallucinations and suicidal ideas.       Objective   Physical Exam  Vitals and nursing note reviewed.   Constitutional:       Appearance: Normal appearance. He is well-developed.   HENT:      Head: Normocephalic and atraumatic.      Nose: Nose normal.      Mouth/Throat:      Mouth: Mucous  membranes are moist.      Pharynx: Oropharynx is clear.   Eyes:      Extraocular Movements: Extraocular movements intact.      Conjunctiva/sclera: Conjunctivae normal.      Pupils: Pupils are equal, round, and reactive to light.   Cardiovascular:      Rate and Rhythm: Normal rate and regular rhythm.      Heart sounds: Normal heart sounds. No murmur heard.  No friction rub. No gallop.    Pulmonary:      Effort: Pulmonary effort is normal. No respiratory distress.      Breath sounds: Normal breath sounds. No stridor. No wheezing, rhonchi or rales.   Chest:      Chest wall: No tenderness.   Abdominal:      General: Bowel sounds are normal.      Palpations: Abdomen is soft.   Musculoskeletal:         General: Normal range of motion.      Cervical back: Normal range of motion and neck supple.   Skin:     General: Skin is warm and dry.      Findings: Rash present.   Neurological:      General: No focal deficit present.      Mental Status: He is alert and oriented to person, place, and time. Mental status is at baseline.   Psychiatric:         Mood and Affect: Mood normal.         Behavior: Behavior normal.         Thought Content: Thought content normal.         Judgment: Judgment normal.         Assessment/Plan #1 refer to dermatology #2 refill triamcinolone cream  Problems Addressed this Visit     None      Visit Diagnoses     Rash    -  Primary    Relevant Medications    triamcinolone (KENALOG) 0.1 % cream    Other Relevant Orders    Ambulatory Referral to Dermatology      Diagnoses     Diagnosis Codes Comments    Rash    -  Primary ICD-10-CM: R21  ICD-9-CM: 782.1

## 2022-04-27 RX ORDER — MELOXICAM 15 MG/1
15 TABLET ORAL DAILY
Qty: 90 TABLET | Refills: 1 | Status: SHIPPED | OUTPATIENT
Start: 2022-04-27 | End: 2022-10-24

## 2022-04-29 RX ORDER — ALPRAZOLAM 1 MG/1
TABLET ORAL
Qty: 90 TABLET | Refills: 3 | Status: SHIPPED | OUTPATIENT
Start: 2022-04-29 | End: 2022-09-12

## 2022-06-23 ENCOUNTER — OFFICE VISIT (OUTPATIENT)
Dept: FAMILY MEDICINE CLINIC | Facility: CLINIC | Age: 77
End: 2022-06-23

## 2022-06-23 VITALS
BODY MASS INDEX: 19.57 KG/M2 | HEIGHT: 71 IN | TEMPERATURE: 97.7 F | OXYGEN SATURATION: 98 % | SYSTOLIC BLOOD PRESSURE: 120 MMHG | WEIGHT: 139.8 LBS | HEART RATE: 67 BPM | DIASTOLIC BLOOD PRESSURE: 62 MMHG

## 2022-06-23 DIAGNOSIS — E55.9 VITAMIN D DEFICIENCY, UNSPECIFIED: ICD-10-CM

## 2022-06-23 DIAGNOSIS — E78.2 MIXED HYPERLIPIDEMIA: ICD-10-CM

## 2022-06-23 DIAGNOSIS — M15.9 OSTEOARTHRITIS OF MULTIPLE JOINTS, UNSPECIFIED OSTEOARTHRITIS TYPE: ICD-10-CM

## 2022-06-23 DIAGNOSIS — Z12.5 ENCOUNTER FOR SCREENING FOR MALIGNANT NEOPLASM OF PROSTATE: ICD-10-CM

## 2022-06-23 DIAGNOSIS — C61 PROSTATE CA: ICD-10-CM

## 2022-06-23 DIAGNOSIS — R97.20 PSA ELEVATION: ICD-10-CM

## 2022-06-23 DIAGNOSIS — M25.562 LEFT KNEE PAIN, UNSPECIFIED CHRONICITY: ICD-10-CM

## 2022-06-23 DIAGNOSIS — Z85.46 HISTORY OF PROSTATE CANCER: ICD-10-CM

## 2022-06-23 DIAGNOSIS — Z00.00 MEDICARE ANNUAL WELLNESS VISIT, SUBSEQUENT: Primary | ICD-10-CM

## 2022-06-23 LAB
25(OH)D3 SERPL-MCNC: 82.3 NG/ML (ref 30–100)
ALBUMIN SERPL-MCNC: 4.3 G/DL (ref 3.5–5.2)
ALBUMIN/GLOB SERPL: 1.9 G/DL
ALP SERPL-CCNC: 67 U/L (ref 39–117)
ALT SERPL W P-5'-P-CCNC: 24 U/L (ref 1–41)
ANION GAP SERPL CALCULATED.3IONS-SCNC: 11 MMOL/L (ref 5–15)
AST SERPL-CCNC: 22 U/L (ref 1–40)
BILIRUB SERPL-MCNC: 0.4 MG/DL (ref 0–1.2)
BUN SERPL-MCNC: 20 MG/DL (ref 8–23)
BUN/CREAT SERPL: 22 (ref 7–25)
CALCIUM SPEC-SCNC: 9.4 MG/DL (ref 8.6–10.5)
CHLORIDE SERPL-SCNC: 104 MMOL/L (ref 98–107)
CHOLEST SERPL-MCNC: 165 MG/DL (ref 0–200)
CHROMATIN AB SERPL-ACNC: <10 IU/ML (ref 0–14)
CO2 SERPL-SCNC: 24 MMOL/L (ref 22–29)
CREAT SERPL-MCNC: 0.91 MG/DL (ref 0.76–1.27)
CRP SERPL-MCNC: <0.3 MG/DL (ref 0–0.5)
DEPRECATED RDW RBC AUTO: 44.8 FL (ref 37–54)
EGFRCR SERPLBLD CKD-EPI 2021: 87.9 ML/MIN/1.73
ERYTHROCYTE [DISTWIDTH] IN BLOOD BY AUTOMATED COUNT: 13.1 % (ref 12.3–15.4)
GLOBULIN UR ELPH-MCNC: 2.3 GM/DL
GLUCOSE SERPL-MCNC: 77 MG/DL (ref 65–99)
HCT VFR BLD AUTO: 34.5 % (ref 37.5–51)
HDLC SERPL-MCNC: 55 MG/DL (ref 40–60)
HGB BLD-MCNC: 11.7 G/DL (ref 13–17.7)
LDLC SERPL CALC-MCNC: 95 MG/DL (ref 0–100)
LDLC/HDLC SERPL: 1.71 {RATIO}
MCH RBC QN AUTO: 32.1 PG (ref 26.6–33)
MCHC RBC AUTO-ENTMCNC: 33.9 G/DL (ref 31.5–35.7)
MCV RBC AUTO: 94.5 FL (ref 79–97)
PLATELET # BLD AUTO: 266 10*3/MM3 (ref 140–450)
PMV BLD AUTO: 10 FL (ref 6–12)
POTASSIUM SERPL-SCNC: 5 MMOL/L (ref 3.5–5.2)
PROT SERPL-MCNC: 6.6 G/DL (ref 6–8.5)
PSA SERPL-MCNC: 0.03 NG/ML (ref 0–4)
RBC # BLD AUTO: 3.65 10*6/MM3 (ref 4.14–5.8)
SODIUM SERPL-SCNC: 139 MMOL/L (ref 136–145)
TRIGL SERPL-MCNC: 79 MG/DL (ref 0–150)
VLDLC SERPL-MCNC: 15 MG/DL (ref 5–40)
WBC NRBC COR # BLD: 6.43 10*3/MM3 (ref 3.4–10.8)

## 2022-06-23 PROCEDURE — 86431 RHEUMATOID FACTOR QUANT: CPT | Performed by: INTERNAL MEDICINE

## 2022-06-23 PROCEDURE — 80061 LIPID PANEL: CPT | Performed by: INTERNAL MEDICINE

## 2022-06-23 PROCEDURE — 82306 VITAMIN D 25 HYDROXY: CPT | Performed by: INTERNAL MEDICINE

## 2022-06-23 PROCEDURE — 84153 ASSAY OF PSA TOTAL: CPT | Performed by: INTERNAL MEDICINE

## 2022-06-23 PROCEDURE — 85027 COMPLETE CBC AUTOMATED: CPT | Performed by: INTERNAL MEDICINE

## 2022-06-23 PROCEDURE — G0439 PPPS, SUBSEQ VISIT: HCPCS | Performed by: INTERNAL MEDICINE

## 2022-06-23 PROCEDURE — 1126F AMNT PAIN NOTED NONE PRSNT: CPT | Performed by: INTERNAL MEDICINE

## 2022-06-23 PROCEDURE — 36415 COLL VENOUS BLD VENIPUNCTURE: CPT | Performed by: INTERNAL MEDICINE

## 2022-06-23 PROCEDURE — 86140 C-REACTIVE PROTEIN: CPT | Performed by: INTERNAL MEDICINE

## 2022-06-23 PROCEDURE — 1159F MED LIST DOCD IN RCRD: CPT | Performed by: INTERNAL MEDICINE

## 2022-06-23 PROCEDURE — 1170F FXNL STATUS ASSESSED: CPT | Performed by: INTERNAL MEDICINE

## 2022-06-23 PROCEDURE — 80053 COMPREHEN METABOLIC PANEL: CPT | Performed by: INTERNAL MEDICINE

## 2022-06-23 RX ORDER — TRIAMCINOLONE ACETONIDE 1 MG/G
CREAM TOPICAL 2 TIMES DAILY
Qty: 28.4 G | Refills: 1 | Status: SHIPPED | OUTPATIENT
Start: 2022-06-23

## 2022-06-23 NOTE — PATIENT INSTRUCTIONS
Medicare Wellness  Personal Prevention Plan of Service     Date of Office Visit:    Encounter Provider:  Frank Madison MD  Place of Service:  [facility]  Patient Name: Chaka Paul  :  1945    As part of the Medicare Wellness portion of your visit today, we are providing you with this personalized preventive plan of services (PPPS). This plan is based upon recommendations of the [place] Preventive Services Task Force (USPSTF) and the Advisory Committee on Immunization Practices (ACIP).    This lists the preventive care services that should be considered, and provides dates of when you are due. Items listed as completed are up-to-date and do not require any further intervention.    Health Maintenance   Topic Date Due    TDAP/TD VACCINES (1 - Tdap) Never done    ZOSTER VACCINE (1 of 2) Never done    Pneumococcal Vaccine 65+ (1 - PCV) Never done    HEPATITIS C SCREENING  Never done    AAA SCREEN (ONE-TIME)  Never done    COVID-19 Vaccine (4 - Booster for Pfizer series) 2022    INFLUENZA VACCINE  10/01/2022    LIPID PANEL  10/11/2022    ANNUAL WELLNESS VISIT  2023    COLORECTAL CANCER SCREENING  2023       No orders of the defined types were placed in this encounter.      No follow-ups on file.

## 2022-06-23 NOTE — PROGRESS NOTES
QUICK REFERENCE INFORMATION:  The ABCs of the Annual Wellness Visit    Subsequent Medicare Wellness Visit patient was seen for Medicare wellness exam.  Patient has a history of prostate cancer and PSA went from 0.03-0.06.  Patient is being referred to urology and have it redrawn today.    Dictated utilizing Dragon dictation. If there are questions or for further clarification, please contact me.    HEALTH RISK ASSESSMENT    1945    Recent Hospitalizations:  No hospitalization(s) within the last year..        Current Medical Providers:  Patient Care Team:  Frank Madison MD as PCP - General (Internal Medicine)        Smoking Status:  Social History     Tobacco Use   Smoking Status Former Smoker   Smokeless Tobacco Never Used   Tobacco Comment    quit 6 years ago november       Alcohol Consumption:  Social History     Substance and Sexual Activity   Alcohol Use Not Currently       Depression Screen:   PHQ-2/PHQ-9 Depression Screening 6/23/2022   Retired PHQ-9 Total Score -   Retired Total Score -   Little Interest or Pleasure in Doing Things 0-->not at all   Feeling Down, Depressed or Hopeless 0-->not at all   PHQ-9: Brief Depression Severity Measure Score 0       Health Habits and Functional and Cognitive Screening:  Functional & Cognitive Status 6/23/2022   Do you have difficulty preparing food and eating? No   Do you have difficulty bathing yourself, getting dressed or grooming yourself? No   Do you have difficulty using the toilet? No   Do you have difficulty moving around from place to place? No   Do you have trouble with steps or getting out of a bed or a chair? No   Current Diet Well Balanced Diet   Dental Exam Up to date   Eye Exam Up to date   Exercise (times per week) 4 times per week   Current Exercises Include Aerobics   Current Exercise Activities Include -   Do you need help using the phone?  No   Are you deaf or do you have serious difficulty hearing?  No   Do you need help with transportation?  No   Do you need help shopping? No   Do you need help preparing meals?  No   Do you need help with housework?  No   Do you need help with laundry? No   Do you need help taking your medications? No   Do you need help managing money? No   Do you ever drive or ride in a car without wearing a seat belt? No   Have you felt unusual stress, anger or loneliness in the last month? No   Who do you live with? Spouse   If you need help, do you have trouble finding someone available to you? No   Have you been bothered in the last four weeks by sexual problems? No   Do you have difficulty concentrating, remembering or making decisions? No           Does the patient have evidence of cognitive impairment? No    Aspirin use counseling: Does not need ASA (and currently is not on it)      Recent Lab Results:  CMP:  Lab Results   Component Value Date    BUN 25 (H) 10/11/2021    CREATININE 1.20 10/11/2021    EGFRIFNONA 59 (L) 10/11/2021    BCR 20.8 10/11/2021     10/11/2021    K 5.0 10/11/2021    CO2 23.7 10/11/2021    CALCIUM 10.2 10/11/2021    ALBUMIN 4.70 10/11/2021    BILITOT 0.6 10/11/2021    ALKPHOS 74 10/11/2021    AST 15 10/11/2021    ALT 19 10/11/2021     Lipid Panel:  Lab Results   Component Value Date    CHOL 182 10/11/2021    TRIG 128 10/11/2021    HDL 48 10/11/2021    VLDL 23 10/11/2021    LDLHDL 2.26 10/11/2021     HbA1c:       Visual Acuity:  No exam data present    Age-appropriate Screening Schedule:  Refer to the list below for future screening recommendations based on patient's age, sex and/or medical conditions. Orders for these recommended tests are listed in the plan section. The patient has been provided with a written plan.    Health Maintenance   Topic Date Due   • TDAP/TD VACCINES (1 - Tdap) Never done   • ZOSTER VACCINE (1 of 2) Never done   • INFLUENZA VACCINE  10/01/2022   • LIPID PANEL  10/11/2022        Subjective   History of Present Illness    Chaka Paul is a 77 y.o. male who presents for an  Subsequent Wellness Visit.    The following portions of the patient's history were reviewed and updated as appropriate: allergies, current medications, past family history, past medical history, past social history, past surgical history and problem list.    Outpatient Medications Prior to Visit   Medication Sig Dispense Refill   • ALPRAZolam (XANAX) 1 MG tablet TAKE 1 TABLET BY MOUTH THREE TIMES DAILY AS NEEDED FOR ANXIETY 90 tablet 3   • meloxicam (MOBIC) 15 MG tablet Take 1 tablet by mouth Daily. 90 tablet 1   • simvastatin (ZOCOR) 20 MG tablet TAKE 1 TABLET BY MOUTH EVERY NIGHT 90 tablet 1   • triamcinolone (KENALOG) 0.1 % cream Apply  topically to the appropriate area as directed 2 (Two) Times a Day. Rash on arms legs as needed twice daily 28.4 g 1     No facility-administered medications prior to visit.       Patient Active Problem List   Diagnosis   • Visual impairment   • Atelectasis   • Anxiety   • Mixed hyperlipidemia   • Osteoarthritis of multiple joints   • Prostate CA (HCC)       Advance Care Planning:  ACP discussion was held with the patient during this visit. Patient does not have an advance directive, information provided.    Identification of Risk Factors:  Risk factors include: Advance Directive Discussion.    Review of Systems   Constitutional: Negative for fatigue and fever.   HENT: Positive for congestion. Negative for trouble swallowing.    Eyes: Negative for discharge and visual disturbance.   Respiratory: Negative for choking and shortness of breath.    Cardiovascular: Negative for chest pain and palpitations.   Gastrointestinal: Negative for abdominal pain and blood in stool.   Endocrine: Negative.    Genitourinary: Negative for genital sores and hematuria.   Musculoskeletal: Negative for gait problem and joint swelling.   Skin: Negative for color change, pallor, rash and wound.   Allergic/Immunologic: Positive for environmental allergies. Negative for immunocompromised state.    Neurological: Negative for facial asymmetry and speech difficulty.   Psychiatric/Behavioral: Negative for hallucinations and suicidal ideas.       Compared to one year ago, the patient feels his physical health is better.  Compared to one year ago, the patient feels his mental health is better.    Objective     Physical Exam  Vitals and nursing note reviewed.   Constitutional:       General: He is not in acute distress.     Appearance: Normal appearance. He is well-developed. He is not ill-appearing, toxic-appearing or diaphoretic.   HENT:      Head: Normocephalic and atraumatic.      Right Ear: Tympanic membrane, ear canal and external ear normal. There is no impacted cerumen.      Left Ear: Tympanic membrane, ear canal and external ear normal. There is no impacted cerumen.      Nose: Nose normal. No congestion or rhinorrhea.      Mouth/Throat:      Mouth: Mucous membranes are moist.      Pharynx: Oropharynx is clear. No oropharyngeal exudate or posterior oropharyngeal erythema.   Eyes:      General: No scleral icterus.        Right eye: No discharge.         Left eye: No discharge.      Extraocular Movements: Extraocular movements intact.      Conjunctiva/sclera: Conjunctivae normal.      Pupils: Pupils are equal, round, and reactive to light.   Neck:      Thyroid: No thyromegaly.      Vascular: No carotid bruit or JVD.      Trachea: No tracheal deviation.   Cardiovascular:      Rate and Rhythm: Normal rate and regular rhythm.      Heart sounds: Normal heart sounds. No murmur heard.    No friction rub. No gallop.   Pulmonary:      Effort: Pulmonary effort is normal. No respiratory distress.      Breath sounds: Normal breath sounds. No stridor. No wheezing, rhonchi or rales.   Chest:      Chest wall: No tenderness.   Abdominal:      General: Bowel sounds are normal. There is no distension.      Palpations: Abdomen is soft. There is no mass.      Tenderness: There is no abdominal tenderness. There is no right CVA  "tenderness, left CVA tenderness, guarding or rebound.      Hernia: No hernia is present.   Musculoskeletal:         General: No swelling, tenderness, deformity or signs of injury. Normal range of motion.      Cervical back: Normal range of motion and neck supple. No rigidity. No muscular tenderness.      Right lower leg: No edema.      Left lower leg: No edema.   Lymphadenopathy:      Cervical: No cervical adenopathy.   Skin:     General: Skin is warm and dry.      Coloration: Skin is not jaundiced or pale.      Findings: No bruising, erythema, lesion or rash.   Neurological:      General: No focal deficit present.      Mental Status: He is alert and oriented to person, place, and time. Mental status is at baseline.      Cranial Nerves: No cranial nerve deficit.      Sensory: No sensory deficit.      Motor: No weakness or abnormal muscle tone.      Coordination: Coordination normal.      Gait: Gait normal.      Deep Tendon Reflexes: Reflexes normal.   Psychiatric:         Mood and Affect: Mood normal.         Behavior: Behavior normal.         Thought Content: Thought content normal.         Judgment: Judgment normal.         Vitals:    06/23/22 1256   BP: 120/62   BP Location: Left arm   Patient Position: Sitting   Cuff Size: Adult   Pulse: 67   Temp: 97.7 °F (36.5 °C)   TempSrc: Infrared   SpO2: 98%   Weight: 63.4 kg (139 lb 12.8 oz)   Height: 180.2 cm (70.95\")   PainSc: 0-No pain       BMI is within normal parameters. No other follow-up for BMI required.      Assessment & Plan #1 increasing PSA #2 refer to urology  Patient Self-Management and Personalized Health Advice  The patient has been provided with information about: NA and preventive services including:   · NA.    Visit Diagnoses:    ICD-10-CM ICD-9-CM   1. Medicare annual wellness visit, subsequent  Z00.00 V70.0   2. PSA elevation  R97.20 790.93   3. Prostate CA (HCC)  C61 185   4. Mixed hyperlipidemia  E78.2 272.2   5. Vitamin D deficiency, unspecified  " E55.9 268.9   6. Left knee pain, unspecified chronicity  M25.562 719.46   7. Encounter for screening for malignant neoplasm of prostate   Z12.5 V76.44   8. Osteoarthritis of multiple joints, unspecified osteoarthritis type  M15.9 715.89   9. History of prostate cancer  Z85.46 V10.46       Orders Placed This Encounter   Procedures   • CBC (No Diff)     Standing Status:   Future     Number of Occurrences:   1     Standing Expiration Date:   6/23/2023     Order Specific Question:   Release to patient     Answer:   Immediate   • Comprehensive Metabolic Panel     Order Specific Question:   Release to patient     Answer:   Immediate   • Lipid Panel   • Vitamin D 25 Hydroxy     Order Specific Question:   Release to patient     Answer:   Immediate   • KISHORE     Order Specific Question:   Release to patient     Answer:   Immediate   • C-reactive Protein     Order Specific Question:   Release to patient     Answer:   Immediate   • Rheumatoid Factor     Standing Status:   Future     Number of Occurrences:   1     Standing Expiration Date:   6/23/2023     Order Specific Question:   Release to patient     Answer:   Immediate   • PSA Diagnostic     Order Specific Question:   Release to patient     Answer:   Immediate   • Ambulatory Referral to Urology     Referral Priority:   Routine     Referral Type:   Consultation     Referral Reason:   Specialty Services Required     Requested Specialty:   Urology     Number of Visits Requested:   1       Outpatient Encounter Medications as of 6/23/2022   Medication Sig Dispense Refill   • ALPRAZolam (XANAX) 1 MG tablet TAKE 1 TABLET BY MOUTH THREE TIMES DAILY AS NEEDED FOR ANXIETY 90 tablet 3   • meloxicam (MOBIC) 15 MG tablet Take 1 tablet by mouth Daily. 90 tablet 1   • simvastatin (ZOCOR) 20 MG tablet TAKE 1 TABLET BY MOUTH EVERY NIGHT 90 tablet 1   • triamcinolone (KENALOG) 0.1 % cream Apply  topically to the appropriate area as directed 2 (Two) Times a Day. Rash on arms legs as needed  twice daily 28.4 g 1   • [DISCONTINUED] triamcinolone (KENALOG) 0.1 % cream Apply  topically to the appropriate area as directed 2 (Two) Times a Day. Rash on arms legs as needed twice daily 28.4 g 1     No facility-administered encounter medications on file as of 6/23/2022.       Reviewed use of high risk medication in the elderly: not applicable  Reviewed for potential of harmful drug interactions in the elderly: not applicable    Follow Up:  Return in about 1 month (around 7/23/2022), or if symptoms worsen or fail to improve, for Recheck.     An After Visit Summary and PPPS with all of these plans were given to the patient.

## 2022-06-24 LAB — ANA SER QL: NEGATIVE

## 2022-08-22 RX ORDER — SIMVASTATIN 20 MG
20 TABLET ORAL NIGHTLY
Qty: 90 TABLET | Refills: 1 | Status: SHIPPED | OUTPATIENT
Start: 2022-08-22 | End: 2022-10-25 | Stop reason: SDUPTHER

## 2022-09-12 RX ORDER — ALPRAZOLAM 1 MG/1
TABLET ORAL
Qty: 90 TABLET | Refills: 3 | Status: SHIPPED | OUTPATIENT
Start: 2022-09-12 | End: 2023-01-16

## 2022-10-24 RX ORDER — MELOXICAM 15 MG/1
15 TABLET ORAL DAILY
Qty: 90 TABLET | Refills: 1 | Status: SHIPPED | OUTPATIENT
Start: 2022-10-24 | End: 2022-10-25 | Stop reason: SDUPTHER

## 2022-10-25 ENCOUNTER — OFFICE VISIT (OUTPATIENT)
Dept: FAMILY MEDICINE CLINIC | Facility: CLINIC | Age: 77
End: 2022-10-25

## 2022-10-25 VITALS
BODY MASS INDEX: 18.26 KG/M2 | WEIGHT: 130.4 LBS | SYSTOLIC BLOOD PRESSURE: 138 MMHG | DIASTOLIC BLOOD PRESSURE: 74 MMHG | HEART RATE: 85 BPM | OXYGEN SATURATION: 97 % | TEMPERATURE: 96.9 F | HEIGHT: 71 IN

## 2022-10-25 DIAGNOSIS — R76.8 RHEUMATOID FACTOR POSITIVE: ICD-10-CM

## 2022-10-25 DIAGNOSIS — M15.9 PRIMARY OSTEOARTHRITIS INVOLVING MULTIPLE JOINTS: ICD-10-CM

## 2022-10-25 DIAGNOSIS — E78.2 MIXED HYPERLIPIDEMIA: Primary | ICD-10-CM

## 2022-10-25 LAB — CHROMATIN AB SERPL-ACNC: <10 IU/ML (ref 0–14)

## 2022-10-25 PROCEDURE — 86431 RHEUMATOID FACTOR QUANT: CPT | Performed by: INTERNAL MEDICINE

## 2022-10-25 PROCEDURE — 99214 OFFICE O/P EST MOD 30 MIN: CPT | Performed by: INTERNAL MEDICINE

## 2022-10-25 PROCEDURE — 36415 COLL VENOUS BLD VENIPUNCTURE: CPT | Performed by: INTERNAL MEDICINE

## 2022-10-25 RX ORDER — MELOXICAM 15 MG/1
15 TABLET ORAL DAILY
Qty: 90 TABLET | Refills: 1 | Status: SHIPPED | OUTPATIENT
Start: 2022-10-25

## 2022-10-25 RX ORDER — SIMVASTATIN 20 MG
20 TABLET ORAL NIGHTLY
Qty: 90 TABLET | Refills: 1 | Status: SHIPPED | OUTPATIENT
Start: 2022-10-25 | End: 2023-02-21

## 2022-10-25 NOTE — PROGRESS NOTES
"Chief Complaint  4 month f/u (Going over medicine)    Subjective        Chaka Paul presents to [facility]  History of Present Illness patient was seen for hyperlipidemia.  Patient is being treated with diet exercise and simvastatin 20 mg daily.  Triglycerides 79, HDL 55, LDL 95.  Patient will continue present treatment.  Patient does have osteoarthritis of multiple joints and is using meloxicam 15 mg daily.  Patient pain is controlled with this treatment.  Patient did have a rheumatoid factor of 15 level is being rechecked today.  Patient will follow-up after labs.    Dictated utilizing Dragon dictation. If there are questions or for further clarification, please contact me.    Objective   Vital Signs:  Blood Pressure 138/74   Pulse 85   Temperature 96.9 °F (36.1 °C)   Height 179.2 cm (70.55\")   Weight 59.1 kg (130 lb 6.4 oz)   Oxygen Saturation 97%   Body Mass Index 18.42 kg/m²   Estimated body mass index is 18.42 kg/m² as calculated from the following:    Height as of this encounter: 179.2 cm (70.55\").    Weight as of this encounter: 59.1 kg (130 lb 6.4 oz).          Physical Exam  Vitals and nursing note reviewed.   Constitutional:       Appearance: Normal appearance. He is well-developed.   HENT:      Head: Normocephalic and atraumatic.      Nose: Nose normal.      Mouth/Throat:      Mouth: Mucous membranes are moist.      Pharynx: Oropharynx is clear.   Eyes:      Extraocular Movements: Extraocular movements intact.      Conjunctiva/sclera: Conjunctivae normal.      Pupils: Pupils are equal, round, and reactive to light.   Cardiovascular:      Rate and Rhythm: Normal rate and regular rhythm.      Heart sounds: Normal heart sounds. No murmur heard.    No friction rub. No gallop.   Pulmonary:      Effort: Pulmonary effort is normal. No respiratory distress.      Breath sounds: Normal breath sounds. No stridor. No wheezing, rhonchi or rales.   Chest:      Chest wall: No " tenderness.   Abdominal:      General: Bowel sounds are normal.      Palpations: Abdomen is soft.   Musculoskeletal:         General: Tenderness present. Normal range of motion.      Cervical back: Normal range of motion and neck supple.   Skin:     General: Skin is warm and dry.   Neurological:      General: No focal deficit present.      Mental Status: He is alert and oriented to person, place, and time. Mental status is at baseline.   Psychiatric:         Mood and Affect: Mood normal.         Behavior: Behavior normal.         Thought Content: Thought content normal.         Judgment: Judgment normal.        Result Review : #1 rheumatoid factor #2 continue present treatment for osteoarthritis #3 continue present diet and activity levels                Assessment and Plan   Diagnoses and all orders for this visit:    1. Mixed hyperlipidemia (Primary)    2. Rheumatoid factor positive  -     Rheumatoid Factor; Future    3. Primary osteoarthritis involving multiple joints    Other orders  -     meloxicam (MOBIC) 15 MG tablet; Take 1 tablet by mouth Daily.  Dispense: 90 tablet; Refill: 1  -     simvastatin (ZOCOR) 20 MG tablet; Take 1 tablet by mouth Every Night.  Dispense: 90 tablet; Refill: 1             Follow Up {Instructions Charge Capture  Follow-up Communications :23}  Return in about 4 months (around 2/25/2023), or if symptoms worsen or fail to improve, for Recheck.  Patient was given instructions and counseling regarding his condition or for health maintenance advice. Please see specific information pulled into the AVS if appropriate.

## 2023-01-16 RX ORDER — ALPRAZOLAM 1 MG/1
TABLET ORAL
Qty: 90 TABLET | Refills: 3 | Status: SHIPPED | OUTPATIENT
Start: 2023-01-16

## 2023-02-10 ENCOUNTER — OFFICE VISIT (OUTPATIENT)
Dept: FAMILY MEDICINE CLINIC | Facility: CLINIC | Age: 78
End: 2023-02-10
Payer: MEDICARE

## 2023-02-10 VITALS
HEIGHT: 71 IN | SYSTOLIC BLOOD PRESSURE: 146 MMHG | HEART RATE: 83 BPM | WEIGHT: 125.8 LBS | TEMPERATURE: 98.6 F | DIASTOLIC BLOOD PRESSURE: 86 MMHG | BODY MASS INDEX: 17.61 KG/M2 | OXYGEN SATURATION: 99 %

## 2023-02-10 DIAGNOSIS — R63.4 WEIGHT LOSS: ICD-10-CM

## 2023-02-10 DIAGNOSIS — R53.81 MALAISE AND FATIGUE: ICD-10-CM

## 2023-02-10 DIAGNOSIS — R53.83 MALAISE AND FATIGUE: ICD-10-CM

## 2023-02-10 DIAGNOSIS — E78.2 MIXED HYPERLIPIDEMIA: ICD-10-CM

## 2023-02-10 DIAGNOSIS — R94.31 ABNORMAL EKG: ICD-10-CM

## 2023-02-10 DIAGNOSIS — E55.9 VITAMIN D DEFICIENCY, UNSPECIFIED: ICD-10-CM

## 2023-02-10 DIAGNOSIS — F41.9 ANXIETY: Primary | ICD-10-CM

## 2023-02-10 DIAGNOSIS — Z12.5 PROSTATE CANCER SCREENING: ICD-10-CM

## 2023-02-10 PROBLEM — R76.8 RHEUMATOID FACTOR POSITIVE: Status: RESOLVED | Noted: 2022-10-25 | Resolved: 2023-02-10

## 2023-02-10 PROCEDURE — 93000 ELECTROCARDIOGRAM COMPLETE: CPT | Performed by: INTERNAL MEDICINE

## 2023-02-10 PROCEDURE — 99214 OFFICE O/P EST MOD 30 MIN: CPT | Performed by: INTERNAL MEDICINE

## 2023-02-10 RX ORDER — INHALER, ASSIST DEVICES
1 SPACER (EA) MISCELLANEOUS 4 TIMES DAILY PRN
Qty: 1 EACH | Refills: 0 | Status: SHIPPED | OUTPATIENT
Start: 2023-02-10

## 2023-02-10 RX ORDER — ALBUTEROL SULFATE 90 UG/1
2 AEROSOL, METERED RESPIRATORY (INHALATION) EVERY 6 HOURS PRN
Qty: 18 G | Refills: 3 | Status: SHIPPED | OUTPATIENT
Start: 2023-02-10

## 2023-02-10 NOTE — PROGRESS NOTES
"Chief Complaint  Flank Pain (Rt side to groin dull pain  ) and losing weight ? why    Subjective        Chaka Paul presents to [facility]  History of Present Illness patient was seen for anxiety.  Patient's wife and mother-in-law are extremely sick and he has to care for them.  Patient wanted anxiety medicine.  Patient was informed his alprazolam was for anxiety and to take it as needed.  Patient was also placed on Zoloft 50 mg daily.  Patient will follow-up in 6 weeks.  Patient has weight loss over the past several months.  Patient was informed to take diabetic boost with each meal.  Patient states he does eat 3 meals a day.  Thyroid levels are being drawn.  Patient's lipids treated with diet exercise and statin 20 mg daily.  Triglycerides 79, HDL 55, LDL 95.  Patient's labs were a year ago and he is having it redrawn today.  Patient does have severe malaise and fatigue.  EKG shows ST segment depressions in 2 3 and aVF.  Patient is not having chest pain and would not go to the chest pain clinic today.  Patient is being scheduled for an urgent perfusion stress test.    Dictated utilizing Dragon dictation. If there are questions or for further clarification, please contact me.    Objective   Vital Signs:  Blood Pressure 146/86   Pulse 83   Temperature 98.6 °F (37 °C)   Height 179.2 cm (70.55\")   Weight 57.1 kg (125 lb 12.8 oz)   Oxygen Saturation 99%   Body Mass Index 17.77 kg/m²   Estimated body mass index is 17.77 kg/m² as calculated from the following:    Height as of this encounter: 179.2 cm (70.55\").    Weight as of this encounter: 57.1 kg (125 lb 12.8 oz).             Physical Exam  Vitals and nursing note reviewed.   Constitutional:       Appearance: Normal appearance. He is well-developed.   HENT:      Head: Normocephalic and atraumatic.      Nose: Nose normal.      Mouth/Throat:      Mouth: Mucous membranes are moist.      Pharynx: Oropharynx is clear.   Eyes:      " Extraocular Movements: Extraocular movements intact.      Conjunctiva/sclera: Conjunctivae normal.      Pupils: Pupils are equal, round, and reactive to light.   Cardiovascular:      Rate and Rhythm: Normal rate and regular rhythm.      Heart sounds: Normal heart sounds. No murmur heard.    No friction rub. No gallop.   Pulmonary:      Effort: Pulmonary effort is normal. No respiratory distress.      Breath sounds: Normal breath sounds. No stridor. No wheezing, rhonchi or rales.   Chest:      Chest wall: No tenderness.   Abdominal:      General: Bowel sounds are normal.      Palpations: Abdomen is soft.   Musculoskeletal:         General: Normal range of motion.      Cervical back: Normal range of motion and neck supple.   Skin:     General: Skin is warm and dry.   Neurological:      General: No focal deficit present.      Mental Status: He is alert and oriented to person, place, and time. Mental status is at baseline.   Psychiatric:         Mood and Affect: Mood normal.         Behavior: Behavior normal.         Thought Content: Thought content normal.         Judgment: Judgment normal.        Result Review :                   Assessment and Plan  1 Zoloft 50 mg daily, continue alprazolam as needed #2 EKG, recommend chest pain clinic, urgent stress perfusion test #3 lab #4 continue monitoring blood pressure  Diagnoses and all orders for this visit:    1. Anxiety (Primary)  -     CBC (No Diff)  -     Comprehensive Metabolic Panel  -     Lipid Panel  -     Vitamin D,25-Hydroxy  -     Thyroid Panel With TSH  -     PSA Screen    2. Weight loss  -     CBC (No Diff)  -     Comprehensive Metabolic Panel  -     Lipid Panel  -     Vitamin D,25-Hydroxy  -     Thyroid Panel With TSH  -     PSA Screen    3. Mixed hyperlipidemia  -     CBC (No Diff)  -     Comprehensive Metabolic Panel  -     Lipid Panel  -     Vitamin D,25-Hydroxy  -     Thyroid Panel With TSH  -     PSA Screen    4. Prostate cancer screening  -     CBC (No  Diff)  -     Comprehensive Metabolic Panel  -     Lipid Panel  -     Vitamin D,25-Hydroxy  -     Thyroid Panel With TSH  -     PSA Screen    5. Vitamin D deficiency, unspecified  -     CBC (No Diff)  -     Comprehensive Metabolic Panel  -     Lipid Panel  -     Vitamin D,25-Hydroxy  -     Thyroid Panel With TSH  -     PSA Screen    6. Malaise and fatigue  -     ECG 12 Lead  -     Stress test with myocardial perfusion; Future    7. Abnormal EKG  -     Stress test with myocardial perfusion; Future    Other orders  -     sertraline (Zoloft) 50 MG tablet; Take 1 tablet by mouth Daily.  Dispense: 90 tablet; Refill: 1  -     albuterol sulfate  (90 Base) MCG/ACT inhaler; Inhale 2 puffs Every 6 (Six) Hours As Needed for Wheezing.  Dispense: 18 g; Refill: 3  -     Spacer/Aero-Holding Chambers (Pocket Spacer) device; 1 Device 4 (Four) Times a Day As Needed (wheezing).  Dispense: 1 each; Refill: 0             Follow Up   Return in about 6 weeks (around 3/24/2023), or if symptoms worsen or fail to improve, for Recheck.  Patient was given instructions and counseling regarding his condition or for health maintenance advice. Please see specific information pulled into the AVS if appropriate.

## 2023-02-11 LAB
25(OH)D3+25(OH)D2 SERPL-MCNC: 113 NG/ML (ref 30–100)
ALBUMIN SERPL-MCNC: 4.8 G/DL (ref 3.5–5.2)
ALBUMIN/GLOB SERPL: 2.3 G/DL
ALP SERPL-CCNC: 65 U/L (ref 39–117)
ALT SERPL-CCNC: 17 U/L (ref 1–41)
AST SERPL-CCNC: 18 U/L (ref 1–40)
BILIRUB SERPL-MCNC: 0.6 MG/DL (ref 0–1.2)
BUN SERPL-MCNC: 20 MG/DL (ref 8–23)
BUN/CREAT SERPL: 16.4 (ref 7–25)
CALCIUM SERPL-MCNC: 10.5 MG/DL (ref 8.6–10.5)
CHLORIDE SERPL-SCNC: 102 MMOL/L (ref 98–107)
CHOLEST SERPL-MCNC: 190 MG/DL (ref 0–200)
CO2 SERPL-SCNC: 25.4 MMOL/L (ref 22–29)
CREAT SERPL-MCNC: 1.22 MG/DL (ref 0.76–1.27)
EGFRCR SERPLBLD CKD-EPI 2021: 61.8 ML/MIN/1.73
ERYTHROCYTE [DISTWIDTH] IN BLOOD BY AUTOMATED COUNT: 12.8 % (ref 12.3–15.4)
FT4I SERPL CALC-MCNC: 2.9 (ref 1.2–4.9)
GLOBULIN SER CALC-MCNC: 2.1 GM/DL
GLUCOSE SERPL-MCNC: 92 MG/DL (ref 65–99)
HCT VFR BLD AUTO: 38.5 % (ref 37.5–51)
HDLC SERPL-MCNC: 70 MG/DL (ref 40–60)
HGB BLD-MCNC: 12.8 G/DL (ref 13–17.7)
LDLC SERPL CALC-MCNC: 103 MG/DL (ref 0–100)
MCH RBC QN AUTO: 32.9 PG (ref 26.6–33)
MCHC RBC AUTO-ENTMCNC: 33.2 G/DL (ref 31.5–35.7)
MCV RBC AUTO: 99 FL (ref 79–97)
PLATELET # BLD AUTO: 346 10*3/MM3 (ref 140–450)
POTASSIUM SERPL-SCNC: 4.4 MMOL/L (ref 3.5–5.2)
PROT SERPL-MCNC: 6.9 G/DL (ref 6–8.5)
PSA SERPL-MCNC: 0.06 NG/ML (ref 0–4)
RBC # BLD AUTO: 3.89 10*6/MM3 (ref 4.14–5.8)
SODIUM SERPL-SCNC: 138 MMOL/L (ref 136–145)
T3RU NFR SERPL: 27 % (ref 24–39)
T4 SERPL-MCNC: 10.7 UG/DL (ref 4.5–12)
TRIGL SERPL-MCNC: 97 MG/DL (ref 0–150)
TSH SERPL DL<=0.005 MIU/L-ACNC: 1.39 UIU/ML (ref 0.45–4.5)
VLDLC SERPL CALC-MCNC: 17 MG/DL (ref 5–40)
WBC # BLD AUTO: 6.9 10*3/MM3 (ref 3.4–10.8)

## 2023-02-16 ENCOUNTER — HOSPITAL ENCOUNTER (OUTPATIENT)
Dept: NUCLEAR MEDICINE | Facility: HOSPITAL | Age: 78
Discharge: HOME | End: 2023-02-16
Payer: MEDICARE

## 2023-02-16 DIAGNOSIS — R53.81 MALAISE AND FATIGUE: ICD-10-CM

## 2023-02-16 DIAGNOSIS — R53.83 MALAISE AND FATIGUE: ICD-10-CM

## 2023-02-16 DIAGNOSIS — R94.31 ABNORMAL EKG: ICD-10-CM

## 2023-02-16 LAB
BH CV REST NUCLEAR ISOTOPE DOSE: 10.8 MCI
BH CV STRESS COMMENTS STAGE 1: NORMAL
BH CV STRESS DOSE REGADENOSON STAGE 1: 0.4
BH CV STRESS DURATION MIN STAGE 1: 0
BH CV STRESS DURATION SEC STAGE 1: 10
BH CV STRESS NUCLEAR ISOTOPE DOSE: 31 MCI
BH CV STRESS PROTOCOL 1: NORMAL
BH CV STRESS RECOVERY BP: NORMAL MMHG
BH CV STRESS RECOVERY HR: 85 BPM
BH CV STRESS STAGE 1: 1
LV EF NUC BP: 57 %
MAXIMAL PREDICTED HEART RATE: 145 BPM
PERCENT MAX PREDICTED HR: 75.17 %
STRESS BASELINE BP: NORMAL MMHG
STRESS BASELINE HR: 68 BPM
STRESS PERCENT HR: 88 %
STRESS POST PEAK BP: NORMAL MMHG
STRESS POST PEAK HR: 109 BPM
STRESS TARGET HR: 123 BPM

## 2023-02-16 PROCEDURE — 93016 CV STRESS TEST SUPVJ ONLY: CPT | Performed by: INTERNAL MEDICINE

## 2023-02-16 PROCEDURE — 0 TECHNETIUM SESTAMIBI: Performed by: INTERNAL MEDICINE

## 2023-02-16 PROCEDURE — 93017 CV STRESS TEST TRACING ONLY: CPT

## 2023-02-16 PROCEDURE — A9500 TC99M SESTAMIBI: HCPCS | Performed by: INTERNAL MEDICINE

## 2023-02-16 PROCEDURE — 78452 HT MUSCLE IMAGE SPECT MULT: CPT

## 2023-02-16 PROCEDURE — 78452 HT MUSCLE IMAGE SPECT MULT: CPT | Performed by: INTERNAL MEDICINE

## 2023-02-16 PROCEDURE — 93018 CV STRESS TEST I&R ONLY: CPT | Performed by: INTERNAL MEDICINE

## 2023-02-16 PROCEDURE — 25010000002 REGADENOSON 0.4 MG/5ML SOLUTION: Performed by: INTERNAL MEDICINE

## 2023-02-16 RX ADMIN — TECHNETIUM TC 99M SESTAMIBI 1 DOSE: 1 INJECTION INTRAVENOUS at 08:58

## 2023-02-16 RX ADMIN — REGADENOSON 0.4 MG: 0.08 INJECTION, SOLUTION INTRAVENOUS at 08:58

## 2023-02-16 RX ADMIN — TECHNETIUM TC 99M SESTAMIBI 1 DOSE: 1 INJECTION INTRAVENOUS at 07:55

## 2023-02-21 RX ORDER — SIMVASTATIN 20 MG
20 TABLET ORAL NIGHTLY
Qty: 90 TABLET | Refills: 1 | Status: SHIPPED | OUTPATIENT
Start: 2023-02-21

## 2023-02-22 ENCOUNTER — TELEPHONE (OUTPATIENT)
Dept: FAMILY MEDICINE CLINIC | Facility: CLINIC | Age: 78
End: 2023-02-22

## 2023-02-22 NOTE — TELEPHONE ENCOUNTER
Caller: Chaka Paul    Relationship: Self    Best call back number: [phone]    What is the best time to reach you: ANY     Who are you requesting to speak with (clinical staff, provider,  specific staff member): CLINICAL STAFF     What was the call regarding: RESULTS OF STRESS TEST     Do you require a callback: YES

## 2023-03-29 ENCOUNTER — OFFICE VISIT (OUTPATIENT)
Dept: FAMILY MEDICINE CLINIC | Facility: CLINIC | Age: 78
End: 2023-03-29
Payer: MEDICARE

## 2023-03-29 VITALS
HEIGHT: 71 IN | OXYGEN SATURATION: 99 % | TEMPERATURE: 97.8 F | DIASTOLIC BLOOD PRESSURE: 68 MMHG | BODY MASS INDEX: 18.06 KG/M2 | WEIGHT: 129 LBS | HEART RATE: 70 BPM | SYSTOLIC BLOOD PRESSURE: 132 MMHG

## 2023-03-29 DIAGNOSIS — E78.2 MIXED HYPERLIPIDEMIA: ICD-10-CM

## 2023-03-29 DIAGNOSIS — M15.9 PRIMARY OSTEOARTHRITIS INVOLVING MULTIPLE JOINTS: ICD-10-CM

## 2023-03-29 DIAGNOSIS — F41.9 ANXIETY: Primary | ICD-10-CM

## 2023-03-29 PROCEDURE — 1159F MED LIST DOCD IN RCRD: CPT | Performed by: INTERNAL MEDICINE

## 2023-03-29 PROCEDURE — 99214 OFFICE O/P EST MOD 30 MIN: CPT | Performed by: INTERNAL MEDICINE

## 2023-03-29 PROCEDURE — 1160F RVW MEDS BY RX/DR IN RCRD: CPT | Performed by: INTERNAL MEDICINE

## 2023-03-29 NOTE — PROGRESS NOTES
"Chief Complaint  6 week f/u and go over labs    Subjective        Chaka Paul presents to [facility]  History of Present Illness patient was seen for anxiety.  Patient was taking sertraline 50 mg daily.  Patient also has alprazolam 1 mg 3 times daily as needed.  Patient states he could not tolerate the sertraline and it was stopped.  Patient states he just wants to stay on the alprazolam as needed.  Patient's osteoarthritis has been treated with meloxicam 15 mg daily as needed pain..  Patient is stable with this regimen and will continue.  Patient's lipids treated with diet exercise and simvastatin 20 mg daily.  Triglycerides 97, HDL 70, .  Patient will continue present treatment.    Dictated utilizing Dragon dictation. If there are questions or for further clarification, please contact me.    Objective   Vital Signs:  Blood Pressure 132/68   Pulse 70   Temperature 97.8 °F (36.6 °C)   Height 179.2 cm (70.55\")   Weight 58.5 kg (129 lb)   Oxygen Saturation 99%   Body Mass Index 18.22 kg/m²   Estimated body mass index is 18.22 kg/m² as calculated from the following:    Height as of this encounter: 179.2 cm (70.55\").    Weight as of this encounter: 58.5 kg (129 lb).             Physical Exam  Vitals and nursing note reviewed.   Constitutional:       Appearance: Normal appearance. He is well-developed.   HENT:      Head: Normocephalic and atraumatic.      Nose: Nose normal.      Mouth/Throat:      Mouth: Mucous membranes are moist.      Pharynx: Oropharynx is clear.   Eyes:      Extraocular Movements: Extraocular movements intact.      Conjunctiva/sclera: Conjunctivae normal.      Pupils: Pupils are equal, round, and reactive to light.   Cardiovascular:      Rate and Rhythm: Normal rate and regular rhythm.      Heart sounds: Normal heart sounds. No murmur heard.    No friction rub. No gallop.   Pulmonary:      Effort: Pulmonary effort is normal. No respiratory distress.     "  Breath sounds: Normal breath sounds. No stridor. No wheezing, rhonchi or rales.   Chest:      Chest wall: No tenderness.   Abdominal:      General: Bowel sounds are normal.      Palpations: Abdomen is soft.   Musculoskeletal:         General: Normal range of motion.      Cervical back: Normal range of motion and neck supple.   Skin:     General: Skin is warm and dry.   Neurological:      General: No focal deficit present.      Mental Status: He is alert and oriented to person, place, and time. Mental status is at baseline.   Psychiatric:         Behavior: Behavior normal.         Thought Content: Thought content normal.         Judgment: Judgment normal.      Comments: Moderate anxiety        Result Review :                   Assessment and Plan  1 DC sertraline, continue alprazolam #2 continue present diet and activity level  Diagnoses and all orders for this visit:    1. Anxiety (Primary)    2. Mixed hyperlipidemia    3. Primary osteoarthritis involving multiple joints             Follow Up   Return in about 6 months (around 9/29/2023), or if symptoms worsen or fail to improve, for Recheck.  Patient was given instructions and counseling regarding his condition or for health maintenance advice. Please see specific information pulled into the AVS if appropriate.

## 2023-04-10 ENCOUNTER — OFFICE VISIT (OUTPATIENT)
Dept: FAMILY MEDICINE CLINIC | Facility: CLINIC | Age: 78
End: 2023-04-10
Payer: MEDICARE

## 2023-04-10 VITALS
BODY MASS INDEX: 18.09 KG/M2 | WEIGHT: 129.2 LBS | SYSTOLIC BLOOD PRESSURE: 132 MMHG | DIASTOLIC BLOOD PRESSURE: 80 MMHG | OXYGEN SATURATION: 98 % | HEIGHT: 71 IN | TEMPERATURE: 98 F | HEART RATE: 73 BPM

## 2023-04-10 DIAGNOSIS — M51.36 DDD (DEGENERATIVE DISC DISEASE), LUMBAR: ICD-10-CM

## 2023-04-10 DIAGNOSIS — R10.31 RIGHT LOWER QUADRANT PAIN: ICD-10-CM

## 2023-04-10 DIAGNOSIS — M54.50 RIGHT LUMBAR PAIN: Primary | ICD-10-CM

## 2023-04-10 DIAGNOSIS — R10.9 RIGHT FLANK PAIN: ICD-10-CM

## 2023-04-10 LAB
BILIRUB BLD-MCNC: NEGATIVE MG/DL
CLARITY, POC: CLEAR
COLOR UR: YELLOW
EXPIRATION DATE: NORMAL
GLUCOSE UR STRIP-MCNC: NEGATIVE MG/DL
KETONES UR QL: NEGATIVE
LEUKOCYTE EST, POC: NEGATIVE
Lab: NORMAL
NITRITE UR-MCNC: NEGATIVE MG/ML
PH UR: 6 [PH] (ref 5–8)
PROT UR STRIP-MCNC: NEGATIVE MG/DL
RBC # UR STRIP: NEGATIVE /UL
SP GR UR: 1.01 (ref 1–1.03)
UROBILINOGEN UR QL: NORMAL

## 2023-04-10 RX ORDER — BACLOFEN 10 MG/1
10 TABLET ORAL 3 TIMES DAILY
Qty: 30 TABLET | Refills: 1 | Status: SHIPPED | OUTPATIENT
Start: 2023-04-10

## 2023-04-10 RX ORDER — METHYLPREDNISOLONE 4 MG/1
TABLET ORAL
Qty: 21 TABLET | Refills: 0 | Status: SHIPPED | OUTPATIENT
Start: 2023-04-10

## 2023-04-10 NOTE — PATIENT INSTRUCTIONS
Go to [facility] for Xray.   [address] [place] [address]  Enter at Entrance A and go straight to the Radiology Department. The order is in the computer system. You do not need an appointment and you can go at any time.    Our office will be in touch with you to get the ultrasound scheduled at the hospital.    Patient agrees with plan of care and understands instructions. Call if worsening symptoms or any problems or concerns.

## 2023-04-10 NOTE — PROGRESS NOTES
"Chief Complaint  Back Pain (Lower right side. Since 4/6), Edema, and Groin Pain (/)    Subjective        Chaka Paul presents to [facility]  History of Present Illness  Patient is a 78-year-old male, established patient of Dr. Madison, and new to me.  Patient here today with acute right lower back pain that started last Thursday.  Patient reports the pain is intermittent and he feels a burning sensation in the right lower back.  Patient denies sciatica.  Patient denies pain worsening with movement in office.  Patient reports he is also had right lower quadrant pain that radiates into his back that started about 2 weeks ago. No dysuria, hematuria, diarrhea or vomiting. Takes pepto bismol and Miralax, mild relief.  Patient has never had scan of abdomen and states he still has his gallbladder and appendix.    Objective   Vital Signs:  /80 (BP Location: Left arm, Patient Position: Sitting)   Pulse 73   Temp 98 °F (36.7 °C)   Ht 179.2 cm (70.55\")   Wt 58.6 kg (129 lb 3.2 oz)   SpO2 98%   BMI 18.25 kg/m²   Estimated body mass index is 18.25 kg/m² as calculated from the following:    Height as of this encounter: 179.2 cm (70.55\").    Weight as of this encounter: 58.6 kg (129 lb 3.2 oz).             Physical Exam  Constitutional:       General: He is awake.      Appearance: Normal appearance.   HENT:      Head: Normocephalic and atraumatic.      Nose: Nose normal.   Eyes:      Extraocular Movements: Extraocular movements intact.      Conjunctiva/sclera: Conjunctivae normal.      Pupils: Pupils are equal, round, and reactive to light.   Cardiovascular:      Rate and Rhythm: Normal rate and regular rhythm.      Pulses: Normal pulses.      Heart sounds: Normal heart sounds.   Pulmonary:      Effort: Pulmonary effort is normal.      Breath sounds: Normal breath sounds and air entry.   Musculoskeletal:      Lumbar back: Deformity, spasms and tenderness present.   Skin:     General: " Skin is warm and dry.   Neurological:      General: No focal deficit present.      Mental Status: He is alert and oriented to person, place, and time. Mental status is at baseline.   Psychiatric:         Attention and Perception: Attention normal.         Behavior: Behavior normal. Behavior is cooperative.        Result Review :                   Assessment and Plan   Diagnoses and all orders for this visit:    1. Right lumbar pain (Primary)  -     XR Spine Lumbar 4+ View; Future  -     methylPREDNISolone (MEDROL) 4 MG dose pack; Take as directed on package instructions.  Dispense: 21 tablet; Refill: 0  -     baclofen (LIORESAL) 10 MG tablet; Take 1 tablet by mouth 3 (Three) Times a Day.  Dispense: 30 tablet; Refill: 1  -     POCT urinalysis dipstick, automated    2. Right lower quadrant pain  -     US Abdomen Complete; Future    3. Right flank pain  -     POCT urinalysis dipstick, automated    Go to [facility] for Xray.   • [address] [place] [address]  Enter at Entrance A and go straight to the Radiology Department. The order is in the computer system. You do not need an appointment and you can go at any time.    Our office will be in touch with you to get the ultrasound scheduled at the hospital.    Patient agrees with plan of care and understands instructions. Call if worsening symptoms or any problems or concerns.            Follow Up   No follow-ups on file.  Patient was given instructions and counseling regarding his condition or for health maintenance advice. Please see specific information pulled into the AVS if appropriate.

## 2023-04-14 ENCOUNTER — HOSPITAL ENCOUNTER (OUTPATIENT)
Dept: GENERAL RADIOLOGY | Facility: HOSPITAL | Age: 78
Discharge: HOME | End: 2023-04-14
Payer: MEDICARE

## 2023-04-14 DIAGNOSIS — M54.50 RIGHT LUMBAR PAIN: ICD-10-CM

## 2023-04-14 PROCEDURE — 72110 X-RAY EXAM L-2 SPINE 4/>VWS: CPT

## 2023-04-20 PROBLEM — M51.369 DDD (DEGENERATIVE DISC DISEASE), LUMBAR: Status: ACTIVE | Noted: 2023-04-20

## 2023-04-20 PROBLEM — M51.36 DDD (DEGENERATIVE DISC DISEASE), LUMBAR: Status: ACTIVE | Noted: 2023-04-20

## 2023-04-24 RX ORDER — TRIAMCINOLONE ACETONIDE 1 MG/G
CREAM TOPICAL
Qty: 30 G | Refills: 1 | Status: SHIPPED | OUTPATIENT
Start: 2023-04-24

## 2023-05-03 ENCOUNTER — HOSPITAL ENCOUNTER (OUTPATIENT)
Dept: ULTRASOUND IMAGING | Facility: HOSPITAL | Age: 78
Discharge: HOME | End: 2023-05-03
Payer: MEDICARE

## 2023-05-03 DIAGNOSIS — R10.31 RIGHT LOWER QUADRANT PAIN: ICD-10-CM

## 2023-05-03 PROCEDURE — 76700 US EXAM ABDOM COMPLETE: CPT

## 2023-05-16 ENCOUNTER — OFFICE VISIT (OUTPATIENT)
Dept: GASTROENTEROLOGY | Facility: CLINIC | Age: 78
End: 2023-05-16
Payer: MEDICARE

## 2023-05-16 VITALS
HEART RATE: 105 BPM | WEIGHT: 132.5 LBS | TEMPERATURE: 98.4 F | BODY MASS INDEX: 18.55 KG/M2 | HEIGHT: 71 IN | SYSTOLIC BLOOD PRESSURE: 126 MMHG | OXYGEN SATURATION: 97 % | DIASTOLIC BLOOD PRESSURE: 75 MMHG

## 2023-05-16 DIAGNOSIS — K74.60 CIRRHOSIS OF LIVER WITHOUT ASCITES, UNSPECIFIED HEPATIC CIRRHOSIS TYPE: Primary | ICD-10-CM

## 2023-05-16 PROCEDURE — 99203 OFFICE O/P NEW LOW 30 MIN: CPT | Performed by: INTERNAL MEDICINE

## 2023-05-16 PROCEDURE — 1160F RVW MEDS BY RX/DR IN RCRD: CPT | Performed by: INTERNAL MEDICINE

## 2023-05-16 PROCEDURE — 1159F MED LIST DOCD IN RCRD: CPT | Performed by: INTERNAL MEDICINE

## 2023-05-16 RX ORDER — THIAMINE HCL 50 MG
50 TABLET ORAL DAILY
COMMUNITY

## 2023-05-16 NOTE — PROGRESS NOTES
Chief Complaint   Patient presents with   • Cirrhosis        Chaka Paul is a  78 y.o. male here for an initial visit for cirrhosis found on ultrasound    HPI this 78-year-old white male patient of LEAH Clark presents after undergoing ultrasound of the abdomen on May 5 because of right lower quadrant abdominal pain.  That study showed coarsening of the hepatic echotexture with surface nodularity with an impression of hepatic cirrhosis.  He admits to alcohol use in the past but none for the past 10 years.  He denies any prior exposure history to viral hepatitis.  Review of lab work indicates no elevation in transaminase levels.  Family history notable for liver disease involving a brother who was exposed to agent orange.  He had issues with weight loss in the past but initiated nutritional supplement and that has resolved.  He does admit to some constipation issues and uses MiraLAX.  Has occasional bright red blood associated with straining.  [facility] the last study 10 years ago.  He was told at one time he had polyps.    Past Medical History:   Diagnosis Date   • Anxiety    • Arthritis    • Atelectasis    • Cancer     prostate    • History of medical problems    • Visual impairment        Current Outpatient Medications   Medication Sig Dispense Refill   • ALPRAZolam (XANAX) 1 MG tablet TAKE 1 TABLET BY MOUTH THREE TIMES DAILY AS NEEDED FOR ANXIETY 90 tablet 3   • meloxicam (MOBIC) 15 MG tablet Take 1 tablet by mouth Daily. 90 tablet 1   • Nutritional Supplements (BOOST MAX MEN PO) Take  by mouth.     • simvastatin (ZOCOR) 20 MG tablet TAKE 1 TABLET BY MOUTH EVERY NIGHT 90 tablet 1   • Thiamine HCl (vitamin B-1) 50 MG tablet Take 1 tablet by mouth Daily.     • triamcinolone (KENALOG) 0.1 % cream APPLY TOPICALLY TO THE AFFECTED AREA ON RASH ON ARMS/LEGS TWICE DAILY AS NEEDED 30 g 1     No current facility-administered  medications for this visit.       PRN Meds:.    No Known Allergies    Social History     Socioeconomic History   • Marital status:    Tobacco Use   • Smoking status: Former   • Smokeless tobacco: Never   • Tobacco comments:     quit 6 years ago november   Substance and Sexual Activity   • Alcohol use: Not Currently   • Drug use: Never   • Sexual activity: Defer       Family History   Problem Relation Age of Onset   • Dementia Mother    • Other Mother    • Cancer Father    • Blindness Sister    • No Known Problems Brother    • Seizures Sister    • Blindness Brother    • No Known Problems Brother        Review of Systems   Constitutional: Negative for activity change, appetite change, fatigue and unexpected weight change.   HENT: Negative for congestion, facial swelling, sore throat, trouble swallowing and voice change.    Eyes: Negative for photophobia and visual disturbance.   Respiratory: Negative for cough and choking.    Cardiovascular: Negative for chest pain.   Gastrointestinal: Positive for anal bleeding and constipation. Negative for abdominal distention, abdominal pain, blood in stool, diarrhea, nausea, rectal pain and vomiting.   Endocrine: Negative for polyphagia.   Musculoskeletal: Negative for arthralgias, gait problem and joint swelling.   Skin: Negative for color change, pallor and rash.   Allergic/Immunologic: Negative for food allergies.   Neurological: Negative for speech difficulty and headaches.   Hematological: Does not bruise/bleed easily.   Psychiatric/Behavioral: Negative for agitation, confusion and sleep disturbance.       Vitals:    05/16/23 0936   BP: 126/75   Pulse: 105   Temp: 98.4 °F (36.9 °C)   SpO2: 97%       Physical Exam  Vitals reviewed.   Constitutional:       General: He is not in acute distress.     Appearance: He is well-developed. He is not diaphoretic.   HENT:      Head: Normocephalic.      Mouth/Throat:      Pharynx: No oropharyngeal exudate.   Eyes:      General: No  scleral icterus.     Conjunctiva/sclera: Conjunctivae normal.   Neck:      Thyroid: No thyromegaly.   Cardiovascular:      Rate and Rhythm: Normal rate and regular rhythm.      Heart sounds: No murmur heard.  Pulmonary:      Effort: No respiratory distress.      Breath sounds: Normal breath sounds. No wheezing or rales.   Abdominal:      General: Bowel sounds are normal. There is no distension.      Palpations: Abdomen is soft. There is no mass.      Tenderness: There is no abdominal tenderness.   Musculoskeletal:         General: No tenderness. Normal range of motion.      Cervical back: Normal range of motion.   Lymphadenopathy:      Cervical: No cervical adenopathy.   Skin:     General: Skin is warm and dry.      Findings: No erythema or rash.   Neurological:      Mental Status: He is alert and oriented to person, place, and time.   Psychiatric:         Behavior: Behavior normal.         ASSESSMENT   #1 possible cirrhosis based on ultrasound: Past history of alcohol use, other etiology is not well-defined  #2 constipation  #3 rectal bleeding  #4 history of polyps      PLAN  CT of the abdomen and pelvis  Hepatitis panel with PT and INR  Eventual colonoscopic assessment once radiographic and lab results reviewed      ICD-10-CM ICD-9-CM   1. Cirrhosis of liver without ascites, unspecified hepatic cirrhosis type  K74.60 571.5

## 2023-05-17 LAB
HAV IGM SERPL QL IA: NEGATIVE
HBV CORE IGM SERPL QL IA: NEGATIVE
HBV SURFACE AG SERPL QL IA: NEGATIVE
HCV AB SERPL QL IA: NORMAL
HCV IGG SERPL QL IA: NON REACTIVE
INR PPP: 0.95 (ref 0.9–1.1)
PROTHROMBIN TIME: 12.8 SECONDS (ref 11.7–14.2)

## 2023-05-23 DIAGNOSIS — F41.9 ANXIETY: Primary | ICD-10-CM

## 2023-05-23 RX ORDER — ALPRAZOLAM 1 MG/1
1 TABLET ORAL 3 TIMES DAILY PRN
Qty: 60 TABLET | Refills: 0 | Status: SHIPPED | OUTPATIENT
Start: 2023-05-23 | End: 2023-05-25 | Stop reason: SDUPTHER

## 2023-05-23 NOTE — TELEPHONE ENCOUNTER
Caller: YAOCONSTANZA    Relationship: Emergency Contact    Best call back number: [phone]    Requested Prescriptions:   Requested Prescriptions     Pending Prescriptions Disp Refills   • ALPRAZolam (XANAX) 1 MG tablet 90 tablet 3     Sig: Take 1 tablet by mouth 3 (Three) Times a Day As Needed. for anxiety        Pharmacy where request should be sent: [id number] [address] [facility] [phone] [facility] [phone]      Last office visit with prescribing clinician: 4/10/2023   Last telemedicine visit with prescribing clinician: Visit date not found   Next office visit with prescribing clinician: Visit date not found     Additional details provided by patient: PATIENT IS OUT OF MEDICATION    Does the patient have less than a 3 day supply:  [x] Yes  [] No    Would you like a call back once the refill request has been completed: [] Yes [x] No    If the office needs to give you a call back, can they leave a voicemail: [] Yes [x] No    Nancy Mares Rep   05/23/23 13:58 EDT

## 2023-05-24 NOTE — TELEPHONE ENCOUNTER
Please call pt advise have partial refilled medication, he has not had urine drug screen in his chart. He will need to follow up with new pcp to continue getting this medication.

## 2023-05-25 RX ORDER — ALPRAZOLAM 1 MG/1
1 TABLET ORAL 3 TIMES DAILY PRN
Qty: 60 TABLET | Refills: 0 | Status: SHIPPED | OUTPATIENT
Start: 2023-05-25

## 2023-06-13 ENCOUNTER — TELEPHONE (OUTPATIENT)
Dept: GASTROENTEROLOGY | Facility: CLINIC | Age: 78
End: 2023-06-13
Payer: MEDICARE

## 2023-06-13 NOTE — TELEPHONE ENCOUNTER
----- Message from Arnol POP MD sent at 5/22/2023  2:20 PM EDT -----  Regarding: Lab results  Okay to call results, labs essentially negative.  Awaiting CT scan results.  ----- Message -----  From: Interface, Reflab Results In  Sent: 5/17/2023   7:14 AM EDT  To: Arnol POP MD

## 2023-06-28 ENCOUNTER — TELEPHONE (OUTPATIENT)
Dept: GASTROENTEROLOGY | Facility: CLINIC | Age: 78
End: 2023-06-28

## 2023-06-28 NOTE — TELEPHONE ENCOUNTER
Caller: MELINDA    Relationship to patient: WORKS FOR [employer].    Best call back number: [phone]    Patient is needing: PATIENT IS SCHEDULED FOR  CT OF ABDOMEN PELVIS W/CONTRAST ON 7/7/23. THERE IS AN AUTH APPROVAL FOR UOFL WITHOUT CONTRAST. THEY CAN'T DELETE IT AS THE PATIENT IS ON MEDICARE. THEY SAID A PEER TO PEER WOULD NEED TO BE DONE BY DR BILLINGS. PLEASE CALL MELINDA BACK REGARDING THIS.

## 2023-10-16 ENCOUNTER — TELEPHONE (OUTPATIENT)
Dept: GASTROENTEROLOGY | Facility: CLINIC | Age: 78
End: 2023-10-16
Payer: MEDICARE

## 2023-10-16 NOTE — TELEPHONE ENCOUNTER
PLEASE SEND OA   Caller: CONSTANZA SAMAYOA    Relationship to patient: Emergency Contact    Best call back number: [phone]    Patient is needing: PT'S WIFE WAS CALLING TO SCHEDULE PT'S COLONOSCOPY. IT'S OK TO M.

## 2023-10-16 NOTE — TELEPHONE ENCOUNTER
Hub staff attempted to follow warm transfer process and was unsuccessful     Caller: CONSTANZA SAMAYOA    Relationship to patient: Emergency Contact    Best call back number: [phone]    Patient is needing: PT'S WIFE WAS CALLING TO SCHEDULE PT'S COLONOSCOPY. IT'S OK TO [facility].

## 2023-10-16 NOTE — TELEPHONE ENCOUNTER
Mailed OA questionnaire to patient to complete for screening. Will defer for 30 days and notify referring physician if not received

## 2024-02-13 ENCOUNTER — TELEPHONE (OUTPATIENT)
Dept: GASTROENTEROLOGY | Facility: CLINIC | Age: 79
End: 2024-02-13
Payer: MEDICARE

## 2024-02-13 ENCOUNTER — OFFICE VISIT (OUTPATIENT)
Dept: GASTROENTEROLOGY | Facility: CLINIC | Age: 79
End: 2024-02-13
Payer: MEDICARE

## 2024-02-13 VITALS
HEART RATE: 75 BPM | OXYGEN SATURATION: 96 % | HEIGHT: 71 IN | DIASTOLIC BLOOD PRESSURE: 91 MMHG | WEIGHT: 138 LBS | BODY MASS INDEX: 19.32 KG/M2 | SYSTOLIC BLOOD PRESSURE: 141 MMHG | TEMPERATURE: 96.6 F

## 2024-02-13 DIAGNOSIS — K74.60 CIRRHOSIS OF LIVER WITHOUT ASCITES, UNSPECIFIED HEPATIC CIRRHOSIS TYPE: Primary | ICD-10-CM

## 2024-02-13 DIAGNOSIS — K62.5 RECTAL BLEEDING: ICD-10-CM

## 2024-02-13 DIAGNOSIS — Z80.0 FAMILY HISTORY OF GI MALIGNANCY: ICD-10-CM

## 2024-02-13 PROCEDURE — 1160F RVW MEDS BY RX/DR IN RCRD: CPT | Performed by: INTERNAL MEDICINE

## 2024-02-13 PROCEDURE — 1159F MED LIST DOCD IN RCRD: CPT | Performed by: INTERNAL MEDICINE

## 2024-02-13 PROCEDURE — 99214 OFFICE O/P EST MOD 30 MIN: CPT | Performed by: INTERNAL MEDICINE

## 2024-02-13 RX ORDER — LABETALOL 200 MG/1
TABLET, FILM COATED ORAL 2 TIMES DAILY
COMMUNITY
Start: 2023-06-16

## 2024-02-13 RX ORDER — OMEPRAZOLE 40 MG/1
40 CAPSULE, DELAYED RELEASE ORAL
COMMUNITY
Start: 2023-06-15 | End: 2024-12-21

## 2024-03-22 ENCOUNTER — TELEPHONE (OUTPATIENT)
Dept: GASTROENTEROLOGY | Facility: CLINIC | Age: 79
End: 2024-03-22

## 2024-03-22 NOTE — TELEPHONE ENCOUNTER
Caller: CONSTANZA SAMAYOA    Relationship to patient: Emergency Contact    Best call back number: [phone]    Patient is needing: PATIENT HAS PROCEDURE SCHEDULED FOR JUNE.  WOULD LIKE SOMETHING SOONER IF POSSIBLE.  PLEASE REACH OUT.  THANK YOU

## 2024-03-22 NOTE — TELEPHONE ENCOUNTER
Caller: CONSTANZA SAMAYOA    Relationship to patient: Emergency Contact    Best call back number: [phone]    Patient is needing: PATIENT HAS PROCEDURE SCHEDULED FOR JUNE.  WOULD LIKE SOMETHING SOONER IF POSSIBLE.  PLEASE REACH OUT.  THANK YOU

## 2024-06-26 NOTE — PRE-PROCEDURE INSTRUCTIONS
Spoke to patient he will arrive at 8.30 am. He will start drinking prep at 4:30 am. He will be NPO after 6:30 am.

## 2024-06-27 ENCOUNTER — HOSPITAL ENCOUNTER (OUTPATIENT)
Facility: HOSPITAL | Age: 79
Setting detail: HOSPITAL OUTPATIENT SURGERY
Discharge: HOME | End: 2024-06-27
Attending: INTERNAL MEDICINE | Admitting: INTERNAL MEDICINE
Payer: MEDICARE

## 2024-06-27 ENCOUNTER — ANESTHESIA (OUTPATIENT)
Dept: GASTROENTEROLOGY | Facility: HOSPITAL | Age: 79
End: 2024-06-27
Payer: MEDICARE

## 2024-06-27 ENCOUNTER — ANESTHESIA EVENT (OUTPATIENT)
Dept: GASTROENTEROLOGY | Facility: HOSPITAL | Age: 79
End: 2024-06-27
Payer: MEDICARE

## 2024-06-27 VITALS
WEIGHT: 134.8 LBS | DIASTOLIC BLOOD PRESSURE: 87 MMHG | BODY MASS INDEX: 18.87 KG/M2 | HEIGHT: 71 IN | RESPIRATION RATE: 17 BRPM | SYSTOLIC BLOOD PRESSURE: 107 MMHG | HEART RATE: 65 BPM | OXYGEN SATURATION: 98 %

## 2024-06-27 PROCEDURE — S0260 H&P FOR SURGERY: HCPCS | Performed by: INTERNAL MEDICINE

## 2024-06-27 PROCEDURE — 25810000003 LACTATED RINGERS PER 1000 ML: Performed by: INTERNAL MEDICINE

## 2024-06-27 PROCEDURE — 45378 DIAGNOSTIC COLONOSCOPY: CPT | Performed by: INTERNAL MEDICINE

## 2024-06-27 PROCEDURE — 25010000002 PROPOFOL 10 MG/ML EMULSION: Performed by: NURSE ANESTHETIST, CERTIFIED REGISTERED

## 2024-06-27 RX ORDER — LIDOCAINE HYDROCHLORIDE 20 MG/ML
INJECTION, SOLUTION INFILTRATION; PERINEURAL AS NEEDED
Status: DISCONTINUED | OUTPATIENT
Start: 2024-06-27 | End: 2024-06-27 | Stop reason: SURG

## 2024-06-27 RX ORDER — PROPOFOL 10 MG/ML
VIAL (ML) INTRAVENOUS AS NEEDED
Status: DISCONTINUED | OUTPATIENT
Start: 2024-06-27 | End: 2024-06-27 | Stop reason: SURG

## 2024-06-27 RX ORDER — SODIUM CHLORIDE, SODIUM LACTATE, POTASSIUM CHLORIDE, CALCIUM CHLORIDE 600; 310; 30; 20 MG/100ML; MG/100ML; MG/100ML; MG/100ML
30 INJECTION, SOLUTION INTRAVENOUS CONTINUOUS
Status: DISCONTINUED | OUTPATIENT
Start: 2024-06-27 | End: 2024-06-27 | Stop reason: HOSPADM

## 2024-06-27 RX ADMIN — PROPOFOL 100 MG: 10 INJECTION, EMULSION INTRAVENOUS at 09:56

## 2024-06-27 RX ADMIN — SODIUM CHLORIDE, POTASSIUM CHLORIDE, SODIUM LACTATE AND CALCIUM CHLORIDE 30 ML/HR: 600; 310; 30; 20 INJECTION, SOLUTION INTRAVENOUS at 09:19

## 2024-06-27 RX ADMIN — PROPOFOL 200 MCG/KG/MIN: 10 INJECTION, EMULSION INTRAVENOUS at 09:57

## 2024-06-27 RX ADMIN — LIDOCAINE HYDROCHLORIDE 100 MG: 20 INJECTION, SOLUTION INFILTRATION; PERINEURAL at 09:56

## 2024-06-27 NOTE — ANESTHESIA POSTPROCEDURE EVALUATION
Patient: Chaka Paul    Procedure Summary       Date: 06/27/24 Room / Location:  [facility]    Anesthesia Start: 0950 Anesthesia Stop: 1013    Procedure: COLONOSCOPY to cecum and t.i. Diagnosis:       Diverticulosis      Hemorrhoids      (Rectal bleeding [K62.5])      (Family history of GI malignancy [Z80.0])    Surgeons: Arnol Ramirez MD Provider: Celine Desai MD    Anesthesia Type: MAC ASA Status: 3            Anesthesia Type: MAC    Vitals  Vitals Value Taken Time   /87 06/27/24 1032   Temp     Pulse 60 06/27/24 1034   Resp 17 06/27/24 1032   SpO2 98 % 06/27/24 1034   Vitals shown include unfiled device data.        Post Anesthesia Care and Evaluation    Patient location during evaluation: bedside  Patient participation: complete - patient participated  Level of consciousness: awake  Pain management: adequate    Airway patency: patent  Anesthetic complications: No anesthetic complications    Cardiovascular status: acceptable  Respiratory status: acceptable  Hydration status: acceptable

## 2024-06-27 NOTE — H&P
[facility]  Pre Procedure History & Physical    Chief Complaint:   Rectal bleeding, family history of colon cancer, personal history of polyps    Subjective     HPI:   This 79-year-old male presents the endoscopy suite for colonoscopic examination.  He has a personal history of polyps and a family history of colon cancer involving his father.  He is also had issues with rectal bleeding.  Per his account last colonoscopy was performed at the [facility] 10 years ago.    Past Medical History:   Past Medical History:   Diagnosis Date    Anxiety     Arthritis     Atelectasis     Cancer     prostate     Elevated cholesterol     Hypertension     Visual impairment        Past Surgical History:  Past Surgical History:   Procedure Laterality Date    COLONOSCOPY      PROSTATE SURGERY      TONSILLECTOMY         Family History:  Family History   Problem Relation Age of Onset    Dementia Mother     Other Mother     Cancer Father     Blindness Sister     Seizures Sister     No Known Problems Brother     Blindness Brother     No Known Problems Brother     Malig Hyperthermia Neg Hx        Social History:   reports that he has quit smoking. He has never used smokeless tobacco. He reports that he does not currently use alcohol. He reports that he does not use drugs.    Medications:   No medications prior to admission.       Allergies:  Patient has no known allergies.    ROS:    Pertinent items are noted in HPI, all other systems reviewed and negative     Objective     There were no vitals taken for this visit.    Physical Exam   Constitutional: Pt is oriented to person, place, and time and well-developed, well-nourished, and in no distress.   Mouth/Throat: Oropharynx is clear and moist.   Neck: Normal range of motion.   Cardiovascular: Normal rate, regular rhythm and normal heart sounds.    Pulmonary/Chest: Effort normal and breath sounds normal.   Abdominal: Soft. Nontender  Skin: Skin  is warm and dry.   Psychiatric: Mood, memory, affect and judgment normal.     Assessment & Plan     Diagnosis:  Rectal bleeding  Polyps  Family history    Anticipated Surgical Procedure:  Colonoscopy    The risks, benefits, and alternatives of this procedure have been discussed with the patient or the responsible party- the patient understands and agrees to proceed.

## 2024-06-27 NOTE — ANESTHESIA PREPROCEDURE EVALUATION
Anesthesia Evaluation                  Airway   Mallampati: II  Neck ROM: full  No difficulty expected  Dental - normal exam     Pulmonary    (+) a smoker Former, cigarettes,    ROS comment: Quit smoking and alcohol 10 years ago  Cardiovascular     (+) hypertension, hyperlipidemia      Neuro/Psych  (+) psychiatric history Anxiety  GI/Hepatic/Renal/Endo    (+) GI bleeding     Musculoskeletal     Abdominal    Substance History      OB/GYN          Other   arthritis,   history of cancer                Anesthesia Plan    ASA 3     MAC     intravenous induction     Anesthetic plan, risks, benefits, and alternatives have been provided, discussed and informed consent has been obtained with: patient.    CODE STATUS:

## 2024-06-27 NOTE — DISCHARGE INSTRUCTIONS

## 2025-01-07 ENCOUNTER — APPOINTMENT (OUTPATIENT)
Dept: ULTRASOUND IMAGING | Facility: HOSPITAL | Age: 80
End: 2025-01-07
Payer: MEDICARE

## 2025-01-07 ENCOUNTER — APPOINTMENT (OUTPATIENT)
Dept: CT IMAGING | Facility: HOSPITAL | Age: 80
End: 2025-01-07
Payer: MEDICARE

## 2025-01-07 ENCOUNTER — APPOINTMENT (OUTPATIENT)
Dept: GENERAL RADIOLOGY | Facility: HOSPITAL | Age: 80
End: 2025-01-07
Payer: MEDICARE

## 2025-01-07 ENCOUNTER — HOSPITAL ENCOUNTER (OUTPATIENT)
Facility: HOSPITAL | Age: 80
Setting detail: OBSERVATION
Discharge: HOME | End: 2025-01-09
Attending: EMERGENCY MEDICINE | Admitting: EMERGENCY MEDICINE
Payer: MEDICARE

## 2025-01-07 DIAGNOSIS — R10.11 RUQ ABDOMINAL PAIN: Primary | ICD-10-CM

## 2025-01-07 DIAGNOSIS — K82.8 GALLBLADDER DILATATION: ICD-10-CM

## 2025-01-07 LAB
ALBUMIN SERPL-MCNC: 4.3 G/DL (ref 3.5–5.2)
ALBUMIN/GLOB SERPL: 1.6 G/DL
ALP SERPL-CCNC: 80 U/L (ref 39–117)
ALT SERPL W P-5'-P-CCNC: 18 U/L (ref 1–41)
ANION GAP SERPL CALCULATED.3IONS-SCNC: 10.1 MMOL/L (ref 5–15)
AST SERPL-CCNC: 24 U/L (ref 1–40)
BASOPHILS # BLD AUTO: 0.04 10*3/MM3 (ref 0–0.2)
BASOPHILS NFR BLD AUTO: 0.7 % (ref 0–1.5)
BILIRUB SERPL-MCNC: 0.5 MG/DL (ref 0–1.2)
BILIRUB UR QL STRIP: NEGATIVE
BUN SERPL-MCNC: 16 MG/DL (ref 8–23)
BUN/CREAT SERPL: 13.3 (ref 7–25)
CALCIUM SPEC-SCNC: 9.6 MG/DL (ref 8.6–10.5)
CHLORIDE SERPL-SCNC: 104 MMOL/L (ref 98–107)
CLARITY UR: CLEAR
CO2 SERPL-SCNC: 21.9 MMOL/L (ref 22–29)
COLOR UR: YELLOW
CREAT SERPL-MCNC: 1.2 MG/DL (ref 0.76–1.27)
D-LACTATE SERPL-SCNC: 0.9 MMOL/L (ref 0.5–2)
DEPRECATED RDW RBC AUTO: 45.3 FL (ref 37–54)
EGFRCR SERPLBLD CKD-EPI 2021: 62.3 ML/MIN/1.73
EOSINOPHIL # BLD AUTO: 0.26 10*3/MM3 (ref 0–0.4)
EOSINOPHIL NFR BLD AUTO: 4.4 % (ref 0.3–6.2)
ERYTHROCYTE [DISTWIDTH] IN BLOOD BY AUTOMATED COUNT: 13.1 % (ref 12.3–15.4)
GEN 5 1HR TROPONIN T REFLEX: 20 NG/L
GLOBULIN UR ELPH-MCNC: 2.7 GM/DL
GLUCOSE SERPL-MCNC: 97 MG/DL (ref 65–99)
GLUCOSE UR STRIP-MCNC: NEGATIVE MG/DL
HCT VFR BLD AUTO: 34.8 % (ref 37.5–51)
HGB BLD-MCNC: 11.8 G/DL (ref 13–17.7)
HGB UR QL STRIP.AUTO: NEGATIVE
HOLD SPECIMEN: NORMAL
HOLD SPECIMEN: NORMAL
IMM GRANULOCYTES # BLD AUTO: 0.01 10*3/MM3 (ref 0–0.05)
IMM GRANULOCYTES NFR BLD AUTO: 0.2 % (ref 0–0.5)
KETONES UR QL STRIP: NEGATIVE
LEUKOCYTE ESTERASE UR QL STRIP.AUTO: NEGATIVE
LIPASE SERPL-CCNC: 25 U/L (ref 13–60)
LYMPHOCYTES # BLD AUTO: 1.14 10*3/MM3 (ref 0.7–3.1)
LYMPHOCYTES NFR BLD AUTO: 19.4 % (ref 19.6–45.3)
MCH RBC QN AUTO: 32.2 PG (ref 26.6–33)
MCHC RBC AUTO-ENTMCNC: 33.9 G/DL (ref 31.5–35.7)
MCV RBC AUTO: 95.1 FL (ref 79–97)
MONOCYTES # BLD AUTO: 0.38 10*3/MM3 (ref 0.1–0.9)
MONOCYTES NFR BLD AUTO: 6.5 % (ref 5–12)
NEUTROPHILS NFR BLD AUTO: 4.05 10*3/MM3 (ref 1.7–7)
NEUTROPHILS NFR BLD AUTO: 68.8 % (ref 42.7–76)
NITRITE UR QL STRIP: NEGATIVE
NRBC BLD AUTO-RTO: 0 /100 WBC (ref 0–0.2)
NT-PROBNP SERPL-MCNC: 2128 PG/ML (ref 0–1800)
PH UR STRIP.AUTO: 6 [PH] (ref 5–8)
PLATELET # BLD AUTO: 270 10*3/MM3 (ref 140–450)
PMV BLD AUTO: 8.7 FL (ref 6–12)
POTASSIUM SERPL-SCNC: 5 MMOL/L (ref 3.5–5.2)
PROT SERPL-MCNC: 7 G/DL (ref 6–8.5)
PROT UR QL STRIP: NEGATIVE
RBC # BLD AUTO: 3.66 10*6/MM3 (ref 4.14–5.8)
SODIUM SERPL-SCNC: 136 MMOL/L (ref 136–145)
SP GR UR STRIP: 1.01 (ref 1–1.03)
TROPONIN T NUMERIC DELTA: 1 NG/L
TROPONIN T SERPL HS-MCNC: 19 NG/L
UROBILINOGEN UR QL STRIP: NORMAL
WBC NRBC COR # BLD AUTO: 5.88 10*3/MM3 (ref 3.4–10.8)
WHOLE BLOOD HOLD COAG: NORMAL
WHOLE BLOOD HOLD SPECIMEN: NORMAL

## 2025-01-07 PROCEDURE — 93010 ELECTROCARDIOGRAM REPORT: CPT | Performed by: INTERNAL MEDICINE

## 2025-01-07 PROCEDURE — G0378 HOSPITAL OBSERVATION PER HR: HCPCS

## 2025-01-07 PROCEDURE — 25010000002 LABETALOL 5 MG/ML SOLUTION: Performed by: EMERGENCY MEDICINE

## 2025-01-07 PROCEDURE — 36415 COLL VENOUS BLD VENIPUNCTURE: CPT | Performed by: EMERGENCY MEDICINE

## 2025-01-07 PROCEDURE — 93005 ELECTROCARDIOGRAM TRACING: CPT | Performed by: PHYSICIAN ASSISTANT

## 2025-01-07 PROCEDURE — 25010000002 KETOROLAC TROMETHAMINE PER 15 MG: Performed by: EMERGENCY MEDICINE

## 2025-01-07 PROCEDURE — 71045 X-RAY EXAM CHEST 1 VIEW: CPT

## 2025-01-07 PROCEDURE — 74177 CT ABD & PELVIS W/CONTRAST: CPT

## 2025-01-07 PROCEDURE — 83605 ASSAY OF LACTIC ACID: CPT

## 2025-01-07 PROCEDURE — 81003 URINALYSIS AUTO W/O SCOPE: CPT

## 2025-01-07 PROCEDURE — 25010000002 ONDANSETRON PER 1 MG: Performed by: EMERGENCY MEDICINE

## 2025-01-07 PROCEDURE — 83690 ASSAY OF LIPASE: CPT

## 2025-01-07 PROCEDURE — 76705 ECHO EXAM OF ABDOMEN: CPT

## 2025-01-07 PROCEDURE — 83880 ASSAY OF NATRIURETIC PEPTIDE: CPT | Performed by: PHYSICIAN ASSISTANT

## 2025-01-07 PROCEDURE — 96376 TX/PRO/DX INJ SAME DRUG ADON: CPT

## 2025-01-07 PROCEDURE — 84484 ASSAY OF TROPONIN QUANT: CPT | Performed by: PHYSICIAN ASSISTANT

## 2025-01-07 PROCEDURE — 99285 EMERGENCY DEPT VISIT HI MDM: CPT

## 2025-01-07 PROCEDURE — 96374 THER/PROPH/DIAG INJ IV PUSH: CPT

## 2025-01-07 PROCEDURE — 85025 COMPLETE CBC W/AUTO DIFF WBC: CPT | Performed by: EMERGENCY MEDICINE

## 2025-01-07 PROCEDURE — 25510000001 IOPAMIDOL 61 % SOLUTION: Performed by: EMERGENCY MEDICINE

## 2025-01-07 PROCEDURE — 96375 TX/PRO/DX INJ NEW DRUG ADDON: CPT

## 2025-01-07 PROCEDURE — 80053 COMPREHEN METABOLIC PANEL: CPT

## 2025-01-07 PROCEDURE — 25010000002 HYDROMORPHONE 1 MG/ML SOLUTION: Performed by: EMERGENCY MEDICINE

## 2025-01-07 PROCEDURE — 25010000002 MORPHINE PER 10 MG: Performed by: EMERGENCY MEDICINE

## 2025-01-07 RX ORDER — KETOROLAC TROMETHAMINE 30 MG/ML
30 INJECTION, SOLUTION INTRAMUSCULAR; INTRAVENOUS ONCE
Status: COMPLETED | OUTPATIENT
Start: 2025-01-07 | End: 2025-01-07

## 2025-01-07 RX ORDER — IOPAMIDOL 612 MG/ML
100 INJECTION, SOLUTION INTRAVASCULAR
Status: COMPLETED | OUTPATIENT
Start: 2025-01-07 | End: 2025-01-07

## 2025-01-07 RX ORDER — LABETALOL HYDROCHLORIDE 5 MG/ML
5 INJECTION, SOLUTION INTRAVENOUS ONCE
Status: COMPLETED | OUTPATIENT
Start: 2025-01-07 | End: 2025-01-07

## 2025-01-07 RX ORDER — ONDANSETRON 2 MG/ML
4 INJECTION INTRAMUSCULAR; INTRAVENOUS ONCE
Status: COMPLETED | OUTPATIENT
Start: 2025-01-07 | End: 2025-01-07

## 2025-01-07 RX ORDER — SODIUM CHLORIDE 0.9 % (FLUSH) 0.9 %
10 SYRINGE (ML) INJECTION AS NEEDED
Status: DISCONTINUED | OUTPATIENT
Start: 2025-01-07 | End: 2025-01-09 | Stop reason: HOSPADM

## 2025-01-07 RX ORDER — MORPHINE SULFATE 2 MG/ML
4 INJECTION, SOLUTION INTRAMUSCULAR; INTRAVENOUS ONCE
Status: COMPLETED | OUTPATIENT
Start: 2025-01-07 | End: 2025-01-07

## 2025-01-07 RX ADMIN — HYDROMORPHONE HYDROCHLORIDE 1 MG: 1 INJECTION, SOLUTION INTRAMUSCULAR; INTRAVENOUS; SUBCUTANEOUS at 22:29

## 2025-01-07 RX ADMIN — KETOROLAC TROMETHAMINE 30 MG: 30 INJECTION, SOLUTION INTRAMUSCULAR at 23:17

## 2025-01-07 RX ADMIN — MORPHINE SULFATE 4 MG: 2 INJECTION, SOLUTION INTRAMUSCULAR; INTRAVENOUS at 23:17

## 2025-01-07 RX ADMIN — ONDANSETRON 4 MG: 2 INJECTION, SOLUTION INTRAMUSCULAR; INTRAVENOUS at 20:27

## 2025-01-07 RX ADMIN — IOPAMIDOL 85 ML: 612 INJECTION, SOLUTION INTRAVENOUS at 21:12

## 2025-01-07 RX ADMIN — HYDROMORPHONE HYDROCHLORIDE 1 MG: 1 INJECTION, SOLUTION INTRAMUSCULAR; INTRAVENOUS; SUBCUTANEOUS at 20:27

## 2025-01-07 RX ADMIN — LABETALOL HYDROCHLORIDE 5 MG: 5 INJECTION, SOLUTION INTRAVENOUS at 23:17

## 2025-01-07 NOTE — ED NOTES
"Pt to ED from home via [facility]. EMS called for abd pain. Pt c/o abd pain since 0900 this morning. EMS reports pt initially soa on ems arrival. Pt given albuterol treatment with ems. Pt reports \"breathing hard all day from the pain in my side.\" Pt denies n/v/d. Pt has right lower quadrant abd pain.  "

## 2025-01-08 ENCOUNTER — APPOINTMENT (OUTPATIENT)
Dept: NUCLEAR MEDICINE | Facility: HOSPITAL | Age: 80
End: 2025-01-08
Payer: MEDICARE

## 2025-01-08 LAB
ALBUMIN SERPL-MCNC: 4.2 G/DL (ref 3.5–5.2)
ALBUMIN/GLOB SERPL: 1.8 G/DL
ALP SERPL-CCNC: 76 U/L (ref 39–117)
ALT SERPL W P-5'-P-CCNC: 17 U/L (ref 1–41)
ANION GAP SERPL CALCULATED.3IONS-SCNC: 10.4 MMOL/L (ref 5–15)
AST SERPL-CCNC: 22 U/L (ref 1–40)
BILIRUB SERPL-MCNC: 0.5 MG/DL (ref 0–1.2)
BUN SERPL-MCNC: 17 MG/DL (ref 8–23)
BUN/CREAT SERPL: 15.6 (ref 7–25)
CALCIUM SPEC-SCNC: 9.4 MG/DL (ref 8.6–10.5)
CHLORIDE SERPL-SCNC: 104 MMOL/L (ref 98–107)
CO2 SERPL-SCNC: 21.6 MMOL/L (ref 22–29)
CREAT SERPL-MCNC: 1.09 MG/DL (ref 0.76–1.27)
DEPRECATED RDW RBC AUTO: 46.9 FL (ref 37–54)
EGFRCR SERPLBLD CKD-EPI 2021: 69.9 ML/MIN/1.73
ERYTHROCYTE [DISTWIDTH] IN BLOOD BY AUTOMATED COUNT: 13 % (ref 12.3–15.4)
GLOBULIN UR ELPH-MCNC: 2.4 GM/DL
GLUCOSE SERPL-MCNC: 116 MG/DL (ref 65–99)
HCT VFR BLD AUTO: 35.8 % (ref 37.5–51)
HGB BLD-MCNC: 11.4 G/DL (ref 13–17.7)
MCH RBC QN AUTO: 31 PG (ref 26.6–33)
MCHC RBC AUTO-ENTMCNC: 31.8 G/DL (ref 31.5–35.7)
MCV RBC AUTO: 97.3 FL (ref 79–97)
PLATELET # BLD AUTO: 230 10*3/MM3 (ref 140–450)
PMV BLD AUTO: 8.8 FL (ref 6–12)
POTASSIUM SERPL-SCNC: 5 MMOL/L (ref 3.5–5.2)
PROT SERPL-MCNC: 6.6 G/DL (ref 6–8.5)
RBC # BLD AUTO: 3.68 10*6/MM3 (ref 4.14–5.8)
SODIUM SERPL-SCNC: 136 MMOL/L (ref 136–145)
TROPONIN T SERPL HS-MCNC: 21 NG/L
WBC NRBC COR # BLD AUTO: 5.7 10*3/MM3 (ref 3.4–10.8)

## 2025-01-08 PROCEDURE — 25010000002 HYDROMORPHONE 1 MG/ML SOLUTION: Performed by: STUDENT IN AN ORGANIZED HEALTH CARE EDUCATION/TRAINING PROGRAM

## 2025-01-08 PROCEDURE — 85027 COMPLETE CBC AUTOMATED: CPT | Performed by: EMERGENCY MEDICINE

## 2025-01-08 PROCEDURE — 78227 HEPATOBIL SYST IMAGE W/DRUG: CPT

## 2025-01-08 PROCEDURE — 94799 UNLISTED PULMONARY SVC/PX: CPT

## 2025-01-08 PROCEDURE — 94664 DEMO&/EVAL PT USE INHALER: CPT

## 2025-01-08 PROCEDURE — 96375 TX/PRO/DX INJ NEW DRUG ADDON: CPT

## 2025-01-08 PROCEDURE — 25010000002 METOCLOPRAMIDE PER 10 MG: Performed by: STUDENT IN AN ORGANIZED HEALTH CARE EDUCATION/TRAINING PROGRAM

## 2025-01-08 PROCEDURE — 25010000002 ONDANSETRON PER 1 MG: Performed by: EMERGENCY MEDICINE

## 2025-01-08 PROCEDURE — 34310000005 TECHNETIUM TC 99M MEBROFENIN KIT: Performed by: EMERGENCY MEDICINE

## 2025-01-08 PROCEDURE — 93010 ELECTROCARDIOGRAM REPORT: CPT | Performed by: INTERNAL MEDICINE

## 2025-01-08 PROCEDURE — A9537 TC99M MEBROFENIN: HCPCS | Performed by: EMERGENCY MEDICINE

## 2025-01-08 PROCEDURE — 93005 ELECTROCARDIOGRAM TRACING: CPT | Performed by: NURSE PRACTITIONER

## 2025-01-08 PROCEDURE — 94640 AIRWAY INHALATION TREATMENT: CPT

## 2025-01-08 PROCEDURE — 99203 OFFICE O/P NEW LOW 30 MIN: CPT | Performed by: STUDENT IN AN ORGANIZED HEALTH CARE EDUCATION/TRAINING PROGRAM

## 2025-01-08 PROCEDURE — 96376 TX/PRO/DX INJ SAME DRUG ADON: CPT

## 2025-01-08 PROCEDURE — 84484 ASSAY OF TROPONIN QUANT: CPT | Performed by: NURSE PRACTITIONER

## 2025-01-08 PROCEDURE — G0378 HOSPITAL OBSERVATION PER HR: HCPCS

## 2025-01-08 PROCEDURE — 25010000002 SINCALIDE PER 5 MCG: Performed by: STUDENT IN AN ORGANIZED HEALTH CARE EDUCATION/TRAINING PROGRAM

## 2025-01-08 PROCEDURE — 80053 COMPREHEN METABOLIC PANEL: CPT | Performed by: EMERGENCY MEDICINE

## 2025-01-08 RX ORDER — ONDANSETRON 4 MG/1
4 TABLET, ORALLY DISINTEGRATING ORAL EVERY 6 HOURS PRN
Status: DISCONTINUED | OUTPATIENT
Start: 2025-01-08 | End: 2025-01-09 | Stop reason: HOSPADM

## 2025-01-08 RX ORDER — ONDANSETRON 2 MG/ML
4 INJECTION INTRAMUSCULAR; INTRAVENOUS EVERY 6 HOURS PRN
Status: DISCONTINUED | OUTPATIENT
Start: 2025-01-08 | End: 2025-01-09 | Stop reason: HOSPADM

## 2025-01-08 RX ORDER — LABETALOL 200 MG/1
200 TABLET, FILM COATED ORAL 2 TIMES DAILY
Status: DISCONTINUED | OUTPATIENT
Start: 2025-01-08 | End: 2025-01-09 | Stop reason: HOSPADM

## 2025-01-08 RX ORDER — NALOXONE HCL 0.4 MG/ML
0.4 VIAL (ML) INJECTION
Status: DISCONTINUED | OUTPATIENT
Start: 2025-01-08 | End: 2025-01-09 | Stop reason: HOSPADM

## 2025-01-08 RX ORDER — KIT FOR THE PREPARATION OF TECHNETIUM TC 99M MEBROFENIN 45 MG/10ML
1 INJECTION, POWDER, LYOPHILIZED, FOR SOLUTION INTRAVENOUS
Status: COMPLETED | OUTPATIENT
Start: 2025-01-08 | End: 2025-01-08

## 2025-01-08 RX ORDER — ACETAMINOPHEN 650 MG/1
650 SUPPOSITORY RECTAL EVERY 4 HOURS PRN
Status: DISCONTINUED | OUTPATIENT
Start: 2025-01-08 | End: 2025-01-09 | Stop reason: HOSPADM

## 2025-01-08 RX ORDER — METOCLOPRAMIDE HYDROCHLORIDE 5 MG/ML
10 INJECTION INTRAMUSCULAR; INTRAVENOUS ONCE
Status: COMPLETED | OUTPATIENT
Start: 2025-01-08 | End: 2025-01-08

## 2025-01-08 RX ORDER — SODIUM CHLORIDE 0.9 % (FLUSH) 0.9 %
10 SYRINGE (ML) INJECTION EVERY 12 HOURS SCHEDULED
Status: DISCONTINUED | OUTPATIENT
Start: 2025-01-08 | End: 2025-01-09 | Stop reason: HOSPADM

## 2025-01-08 RX ORDER — ONDANSETRON 4 MG/1
4 TABLET, ORALLY DISINTEGRATING ORAL EVERY 8 HOURS PRN
Qty: 30 TABLET | Refills: 0 | Status: SHIPPED | OUTPATIENT
Start: 2025-01-08

## 2025-01-08 RX ORDER — SODIUM CHLORIDE 0.9 % (FLUSH) 0.9 %
10 SYRINGE (ML) INJECTION AS NEEDED
Status: DISCONTINUED | OUTPATIENT
Start: 2025-01-08 | End: 2025-01-09 | Stop reason: HOSPADM

## 2025-01-08 RX ORDER — ALBUTEROL SULFATE 90 UG/1
2 INHALANT RESPIRATORY (INHALATION) EVERY 4 HOURS PRN
Qty: 18 G | Refills: 3 | Status: SHIPPED | OUTPATIENT
Start: 2025-01-08

## 2025-01-08 RX ORDER — ACETAMINOPHEN 160 MG/5ML
650 SOLUTION ORAL EVERY 4 HOURS PRN
Status: DISCONTINUED | OUTPATIENT
Start: 2025-01-08 | End: 2025-01-09 | Stop reason: HOSPADM

## 2025-01-08 RX ORDER — SODIUM CHLORIDE 9 MG/ML
40 INJECTION, SOLUTION INTRAVENOUS AS NEEDED
Status: DISCONTINUED | OUTPATIENT
Start: 2025-01-08 | End: 2025-01-09 | Stop reason: HOSPADM

## 2025-01-08 RX ORDER — ACETAMINOPHEN 325 MG/1
650 TABLET ORAL EVERY 4 HOURS PRN
Status: DISCONTINUED | OUTPATIENT
Start: 2025-01-08 | End: 2025-01-09 | Stop reason: HOSPADM

## 2025-01-08 RX ORDER — HYDROMORPHONE HYDROCHLORIDE 1 MG/ML
0.5 INJECTION, SOLUTION INTRAMUSCULAR; INTRAVENOUS; SUBCUTANEOUS
Status: DISCONTINUED | OUTPATIENT
Start: 2025-01-08 | End: 2025-01-08

## 2025-01-08 RX ORDER — ALPRAZOLAM 0.5 MG
1 TABLET ORAL 3 TIMES DAILY PRN
Status: DISCONTINUED | OUTPATIENT
Start: 2025-01-08 | End: 2025-01-09 | Stop reason: HOSPADM

## 2025-01-08 RX ORDER — NALOXONE HCL 0.4 MG/ML
0.4 VIAL (ML) INJECTION
Status: DISCONTINUED | OUTPATIENT
Start: 2025-01-08 | End: 2025-01-08

## 2025-01-08 RX ORDER — IPRATROPIUM BROMIDE AND ALBUTEROL SULFATE 2.5; .5 MG/3ML; MG/3ML
3 SOLUTION RESPIRATORY (INHALATION)
Status: DISCONTINUED | OUTPATIENT
Start: 2025-01-08 | End: 2025-01-09 | Stop reason: HOSPADM

## 2025-01-08 RX ORDER — NITROGLYCERIN 0.4 MG/1
0.4 TABLET SUBLINGUAL
Status: DISCONTINUED | OUTPATIENT
Start: 2025-01-08 | End: 2025-01-08

## 2025-01-08 RX ORDER — OXYCODONE AND ACETAMINOPHEN 5; 325 MG/1; MG/1
1 TABLET ORAL EVERY 8 HOURS PRN
Status: DISCONTINUED | OUTPATIENT
Start: 2025-01-08 | End: 2025-01-09 | Stop reason: HOSPADM

## 2025-01-08 RX ADMIN — HYDROMORPHONE HYDROCHLORIDE 1 MG: 1 INJECTION, SOLUTION INTRAMUSCULAR; INTRAVENOUS; SUBCUTANEOUS at 06:27

## 2025-01-08 RX ADMIN — OXYCODONE HYDROCHLORIDE AND ACETAMINOPHEN 1 TABLET: 5; 325 TABLET ORAL at 17:50

## 2025-01-08 RX ADMIN — Medication 10 ML: at 20:12

## 2025-01-08 RX ADMIN — IPRATROPIUM BROMIDE AND ALBUTEROL SULFATE 3 ML: 2.5; .5 SOLUTION RESPIRATORY (INHALATION) at 21:29

## 2025-01-08 RX ADMIN — LABETALOL HYDROCHLORIDE 200 MG: 200 TABLET, FILM COATED ORAL at 20:12

## 2025-01-08 RX ADMIN — THIAMINE HCL TAB 100 MG 50 MG: 100 TAB at 12:55

## 2025-01-08 RX ADMIN — ALPRAZOLAM 1 MG: 0.5 TABLET ORAL at 17:50

## 2025-01-08 RX ADMIN — HYDROMORPHONE HYDROCHLORIDE 1 MG: 1 INJECTION, SOLUTION INTRAMUSCULAR; INTRAVENOUS; SUBCUTANEOUS at 01:02

## 2025-01-08 RX ADMIN — ALPRAZOLAM 1 MG: 0.5 TABLET ORAL at 06:29

## 2025-01-08 RX ADMIN — ACETAMINOPHEN 650 MG: 325 TABLET ORAL at 00:39

## 2025-01-08 RX ADMIN — LABETALOL HYDROCHLORIDE 200 MG: 200 TABLET, FILM COATED ORAL at 12:55

## 2025-01-08 RX ADMIN — Medication 10 ML: at 12:55

## 2025-01-08 RX ADMIN — ONDANSETRON 4 MG: 2 INJECTION, SOLUTION INTRAMUSCULAR; INTRAVENOUS at 14:36

## 2025-01-08 RX ADMIN — HYDROMORPHONE HYDROCHLORIDE 1 MG: 1 INJECTION, SOLUTION INTRAMUSCULAR; INTRAVENOUS; SUBCUTANEOUS at 12:54

## 2025-01-08 RX ADMIN — SINCALIDE 1.2 MCG: 5 INJECTION, POWDER, LYOPHILIZED, FOR SOLUTION INTRAVENOUS at 11:22

## 2025-01-08 RX ADMIN — IPRATROPIUM BROMIDE AND ALBUTEROL SULFATE 3 ML: 2.5; .5 SOLUTION RESPIRATORY (INHALATION) at 14:40

## 2025-01-08 RX ADMIN — HYOSCYAMINE SULFATE 125 MCG: 0.12 TABLET SUBLINGUAL at 18:16

## 2025-01-08 RX ADMIN — METOCLOPRAMIDE 10 MG: 5 INJECTION, SOLUTION INTRAMUSCULAR; INTRAVENOUS at 21:00

## 2025-01-08 RX ADMIN — Medication 10 ML: at 00:17

## 2025-01-08 RX ADMIN — MEBROFENIN 1 DOSE: 45 INJECTION, POWDER, LYOPHILIZED, FOR SOLUTION INTRAVENOUS at 10:23

## 2025-01-08 NOTE — ED NOTES
Nursing report ED to floor  Chaka Paul  79 y.o.  male    HPI :  HPI  Stated Reason for Visit: abd pain  Duration (Days): 1    Chief Complaint  Chief Complaint   Patient presents with    Abdominal Pain       Admitting doctor:   Elvis Sorensen MD    Admitting diagnosis:   The primary encounter diagnosis was RUQ abdominal pain. A diagnosis of Gallbladder dilatation was also pertinent to this visit.    Code status:   Current Code Status       Date Active Code Status Order ID Comments User Context       Not on file            Allergies:   Patient has no known allergies.    Isolation:   No active isolations    Intake and Output  No intake or output data in the 24 hours ending 01/07/25 2309    Weight:       01/07/25  1844   Weight: 61.2 kg (135 lb)       Most recent vitals:   Vitals:    01/07/25 2031 01/07/25 2131 01/07/25 2201 01/07/25 2244   BP: 169/97 (!) 185/80 (!) 185/82 (!) 186/88   Pulse: 75 57 57 53   Resp:  20     Temp:       SpO2: 97% 94% 94% 95%   Weight:       Height:           Active LDAs/IV Access:   Lines, Drains & Airways       Active LDAs       Name Placement date Placement time Site Days    Peripheral IV 01/07/25 2026 Right Antecubital 01/07/25 2026  Antecubital  less than 1                    Labs (abnormal labs have a star):   Labs Reviewed   COMPREHENSIVE METABOLIC PANEL - Abnormal; Notable for the following components:       Result Value    CO2 21.9 (*)     All other components within normal limits    Narrative:     GFR Categories in Chronic Kidney Disease (CKD)      GFR Category          GFR (mL/min/1.73)    Interpretation  G1                     90 or greater         Normal or high (1)  G2                      60-89                Mild decrease (1)  G3a                   45-59                Mild to moderate decrease  G3b                   30-44                Moderate to severe decrease  G4                    15-29                Severe decrease  G5                    14 or less            Kidney failure          (1)In the absence of evidence of kidney disease, neither GFR category G1 or G2 fulfill the criteria for CKD.    eGFR calculation 2021 CKD-EPI creatinine equation, which does not include race as a factor   CBC WITH AUTO DIFFERENTIAL - Abnormal; Notable for the following components:    RBC 3.66 (*)     Hemoglobin 11.8 (*)     Hematocrit 34.8 (*)     Lymphocyte % 19.4 (*)     All other components within normal limits   BNP (IN-HOUSE) - Abnormal; Notable for the following components:    proBNP 2,128.0 (*)     All other components within normal limits    Narrative:     This assay is used as an aid in the diagnosis of individuals suspected of having heart failure. It can be used as an aid in the diagnosis of acute decompensated heart failure (ADHF) in patients presenting with signs and symptoms of ADHF to the emergency department (ED). In addition, NT-proBNP of <300 pg/mL indicates ADHF is not likely.    Age Range Result Interpretation  NT-proBNP Concentration (pg/mL:      <50             Positive            >450                   Gray                 300-450                    Negative             <300    50-75           Positive            >900                  Gray                300-900                  Negative            <300      >75             Positive            >1800                  Gray                300-1800                  Negative            <300   LIPASE - Normal   URINALYSIS W/ MICROSCOPIC IF INDICATED (NO CULTURE) - Normal    Narrative:     Urine microscopic not indicated.   LACTIC ACID, PLASMA - Normal   TROPONIN - Normal    Narrative:     High Sensitive Troponin T Reference Range:  <14.0 ng/L- Negative Female for AMI  <22.0 ng/L- Negative Male for AMI  >=14 - Abnormal Female indicating possible myocardial injury.  >=22 - Abnormal Male indicating possible myocardial injury.   Clinicians would have to utilize clinical acumen, EKG, Troponin, and serial changes to determine if  it is an Acute Myocardial Infarction or myocardial injury due to an underlying chronic condition.        HIGH SENSITIVITIY TROPONIN T 1HR - Normal    Narrative:     High Sensitive Troponin T Reference Range:  <14.0 ng/L- Negative Female for AMI  <22.0 ng/L- Negative Male for AMI  >=14 - Abnormal Female indicating possible myocardial injury.  >=22 - Abnormal Male indicating possible myocardial injury.   Clinicians would have to utilize clinical acumen, EKG, Troponin, and serial changes to determine if it is an Acute Myocardial Infarction or myocardial injury due to an underlying chronic condition.        RAINBOW DRAW    Narrative:     The following orders were created for panel order [place] Draw.  Procedure                               Abnormality         Status                     ---------                               -----------         ------                     Green Top (Gel)[[id number]]                                  Final result               Lavender Top[[id number]]                                     Final result               Gold Top - SST[[id number]]                                   Final result               Light Blue Top[[id number]]                                   Final result                 Please view results for these tests on the individual orders.   CBC AND DIFFERENTIAL    Narrative:     The following orders were created for panel order CBC & Differential.  Procedure                               Abnormality         Status                     ---------                               -----------         ------                     CBC Auto Differential[[id number]]        Abnormal            Final result                 Please view results for these tests on the individual orders.   GREEN TOP   LAVENDER TOP   GOLD TOP - SST   LIGHT BLUE TOP       EKG:   ECG 12 Lead Chest Pain   Preliminary Result   HEART RATE=61  bpm   RR Pmheopax=667  ms   [place] Hmasfvxa=881  ms   P Horizontal Axis=0  deg   P Front  Axis=50  deg   QRSD Kzmgmxdq=452  ms   QT Hxefckqe=459  ms   QSlO=447  ms   QRS Axis=-19  deg   T Wave Axis=37  deg   - ABNORMAL ECG -   Sinus rhythm   Nonspecific intraventricular conduction delay   Borderline repolarization abnormality   Date and Time of Study:2025-01-07 20:50:30          Meds given in ED:   Medications   sodium chloride 0.9 % flush 10 mL (has no administration in time range)   ketorolac (TORADOL) injection 30 mg (has no administration in time range)   morphine injection 4 mg (has no administration in time range)   labetalol (NORMODYNE,TRANDATE) injection 5 mg (has no administration in time range)   HYDROmorphone (DILAUDID) injection 1 mg (1 mg Intravenous Given 1/7/25 2027)   ondansetron (ZOFRAN) injection 4 mg (4 mg Intravenous Given 1/7/25 2027)   iopamidol (ISOVUE-300) 61 % injection 100 mL (85 mL Intravenous Given by Other 1/7/25 2112)   HYDROmorphone (DILAUDID) injection 1 mg (1 mg Intravenous Given 1/7/25 2229)       Imaging results:  US Gallbladder    Result Date: 1/7/2025  Gallbladder distention, without stones or sludge identified.  This report was finalized on 1/7/2025 10:45 PM by Dr. Monet Lorenzo M.D on Workstation: [email or web address]      CT Abdomen Pelvis With Contrast    Result Date: 1/7/2025   1. Distention of the gallbladder. I don't see any obvious stones, but consider further assessment with gallbladder ultrasound.  Radiation dose reduction techniques were utilized, including automated exposure control and exposure modulation based on body size.   This report was finalized on 1/7/2025 9:31 PM by Dr. Monet Lorenzo M.D on Workstation: [email or web address]      XR Chest 1 View    Result Date: 1/7/2025  No acute findings.  This report was finalized on 1/7/2025 9:03 PM by Dr. Monet Lorenzo M.D on Workstation: [email or web address]       Ambulatory status:   - SBA    Social issues:   Social History     Socioeconomic History    Marital status:    Tobacco Use    Smoking status: Former     Smokeless tobacco: Never    Tobacco comments:     quit 6 years ago november   Substance and Sexual Activity    Alcohol use: Not Currently    Drug use: Never    Sexual activity: Defer       Peripheral Neurovascular  Peripheral Neurovascular (Adult)  Peripheral Neurovascular WDL: WDL    Neuro Cognitive  Neuro Cognitive (Adult)  Cognitive/Neuro/Behavioral WDL: WDL    Learning  Learning Assessment  Learning Readiness and Ability: no barriers identified  Education Provided  Person Taught: patient  Teaching Method: verbal instruction  Teaching Focus: diagnostic test  Education Outcome Evaluation: verbalizes understanding    Respiratory  Respiratory WDL  Respiratory WDL: WDL    Abdominal Pain       Pain Assessments  Pain (Adult)  (0-10) Pain Rating: Rest: 8  (0-10) Pain Rating: Activity: 8  Pain Location: abdomen  Pain Side/Orientation: right, lower  Response to Pain Interventions: nonverbal indicators absent/decreased    NIH Stroke Scale       Tata Koo RN  01/07/25 23:09 EST

## 2025-01-08 NOTE — ED PROVIDER NOTES
EMERGENCY DEPARTMENT ENCOUNTER      Room Number:  03/03  PCP: Frank Madison MD  Independent Historians: Patient  Patient Care Team:  Frank Madison MD as PCP - General (Internal Medicine)  Darrel Pablo MD as Consulting Physician (Urology)       HPI:  Chief Complaint: Abdominal pain    A complete HPI/ROS/PMH/PSH/SH/FH are unobtainable due to: None    Chronic or social conditions impacting patient care (Social Determinants of Health): None      Context: Chaka Paul is a 79 y.o. male with a PMH significant for anxiety, hyperlipidemia, prostate cancer who presents to the ED c/o acute right lower quadrant abdominal pain since around 9 AM this morning.  The patient reports that he has had intermittent discomfort to this area since fall of last year when he was diagnosed with diverticulitis but symptoms today are much worse.  He has associated nausea but no vomiting.  He reports that he had some loose stools last week but that resolved.  No obvious provocative or palliative activity for his symptoms today.  No fever, chills.  No recent abdominal surgeries or antibiotic use.  No blood in the stool.  Changes to urination.      Upon review of prior external notes (non-ED) -and- Review of prior external test results outside of this encounter it appears the patient was evaluated in the office with internal medicine for hypertension on September 11, 2024.  The patient had a normal PSA screen and thyroid panel on 2/10/2023.      PAST MEDICAL HISTORY  Active Ambulatory Problems     Diagnosis Date Noted    Visual impairment     Atelectasis     Anxiety     Mixed hyperlipidemia 07/31/2020    Osteoarthritis of multiple joints 07/31/2020    Prostate CA 02/22/2022    DDD (degenerative disc disease), lumbar 04/20/2023    Rectal bleeding 02/13/2024    Family history of GI malignancy 02/13/2024     Resolved Ambulatory Problems     Diagnosis Date Noted    History of medical problems     Cancer     Arthritis      Rheumatoid factor positive 10/25/2022     Past Medical History:   Diagnosis Date    Colon polyps     Elevated cholesterol     Hypertension          PAST SURGICAL HISTORY  Past Surgical History:   Procedure Laterality Date    COLONOSCOPY  2004    COLONOSCOPY N/A 6/27/2024    Procedure: COLONOSCOPY to cecum and t.i.;  Surgeon: Arnol Ramirez MD;  Location: [facility] ENDOSCOPY;  Service: Gastroenterology;  Laterality: N/A;  pre- rectal bleeding and hx polyps  post- hemorrhoids    PROSTATE SURGERY      TONSILLECTOMY           FAMILY HISTORY  Family History   Problem Relation Age of Onset    Dementia Mother     Other Mother     Cancer Father         colon cancer    Blindness Sister     Seizures Sister     No Known Problems Brother     Blindness Brother     No Known Problems Brother     Malig Hyperthermia Neg Hx          SOCIAL HISTORY  Social History     Socioeconomic History    Marital status:    Tobacco Use    Smoking status: Former    Smokeless tobacco: Never    Tobacco comments:     quit 6 years ago november   Substance and Sexual Activity    Alcohol use: Not Currently    Drug use: Never    Sexual activity: Defer         ALLERGIES  Patient has no known allergies.      REVIEW OF SYSTEMS  Included in HPI  All systems reviewed and negative except for those discussed in HPI.      PHYSICAL EXAM    I have reviewed the triage vital signs and nursing notes.    ED Triage Vitals [01/07/25 1844]   Temp Heart Rate Resp BP SpO2   98 °F (36.7 °C) 79 (!) 30 (!) 201/105 98 %      Temp src Heart Rate Source Patient Position BP Location FiO2 (%)   -- -- -- -- --       Physical Exam  Constitutional:       General: He is not in acute distress.     Appearance: He is well-developed.   HENT:      Head: Normocephalic and atraumatic.   Eyes:      General: No scleral icterus.     Conjunctiva/sclera: Conjunctivae normal.   Neck:      Trachea: No tracheal deviation.   Cardiovascular:      Rate and Rhythm: Normal rate and regular  rhythm.   Pulmonary:      Effort: Pulmonary effort is normal.      Breath sounds: Normal breath sounds.   Abdominal:      Palpations: Abdomen is soft.      Tenderness: There is no abdominal tenderness in the right lower quadrant. There is no guarding.   Musculoskeletal:         General: No deformity.      Cervical back: Normal range of motion.   Lymphadenopathy:      Cervical: No cervical adenopathy.   Skin:     General: Skin is warm and dry.   Neurological:      Mental Status: He is alert and oriented to person, place, and time.   Psychiatric:         Behavior: Behavior normal.         Vital signs and nursing notes reviewed.      PPE: I wore a surgical mask throughout my encounters with the pt. I performed hand hygiene on entry into the pt room and upon exit.     LAB RESULTS  Recent Results (from the past 24 hours)   Comprehensive Metabolic Panel    Collection Time: 01/07/25  6:54 PM    Specimen: Arm, Left; Blood   Result Value Ref Range    Glucose 97 65 - 99 mg/dL    BUN 16 8 - 23 mg/dL    Creatinine 1.20 0.76 - 1.27 mg/dL    Sodium 136 136 - 145 mmol/L    Potassium 5.0 3.5 - 5.2 mmol/L    Chloride 104 98 - 107 mmol/L    CO2 21.9 (L) 22.0 - 29.0 mmol/L    Calcium 9.6 8.6 - 10.5 mg/dL    Total Protein 7.0 6.0 - 8.5 g/dL    Albumin 4.3 3.5 - 5.2 g/dL    ALT (SGPT) 18 1 - 41 U/L    AST (SGOT) 24 1 - 40 U/L    Alkaline Phosphatase 80 39 - 117 U/L    Total Bilirubin 0.5 0.0 - 1.2 mg/dL    Globulin 2.7 gm/dL    A/G Ratio 1.6 g/dL    BUN/Creatinine Ratio 13.3 7.0 - 25.0    Anion Gap 10.1 5.0 - 15.0 mmol/L    eGFR 62.3 >60.0 mL/min/1.73   Lipase    Collection Time: 01/07/25  6:54 PM    Specimen: Arm, Left; Blood   Result Value Ref Range    Lipase 25 13 - 60 U/L   Lactic Acid, Plasma    Collection Time: 01/07/25  6:54 PM    Specimen: Arm, Left; Blood   Result Value Ref Range    Lactate 0.9 0.5 - 2.0 mmol/L   Green Top (Gel)    Collection Time: 01/07/25  6:54 PM   Result Value Ref Range    Extra Tube Hold for add-ons.     Lavender Top    Collection Time: 01/07/25  6:54 PM   Result Value Ref Range    Extra Tube hold for add-on    Gold Top - SST    Collection Time: 01/07/25  6:54 PM   Result Value Ref Range    Extra Tube Hold for add-ons.    Light Blue Top    Collection Time: 01/07/25  6:54 PM   Result Value Ref Range    Extra Tube Hold for add-ons.    CBC Auto Differential    Collection Time: 01/07/25  6:54 PM    Specimen: Arm, Left; Blood   Result Value Ref Range    WBC 5.88 3.40 - 10.80 10*3/mm3    RBC 3.66 (L) 4.14 - 5.80 10*6/mm3    Hemoglobin 11.8 (L) 13.0 - 17.7 g/dL    Hematocrit 34.8 (L) 37.5 - 51.0 %    MCV 95.1 79.0 - 97.0 fL    MCH 32.2 26.6 - 33.0 pg    MCHC 33.9 31.5 - 35.7 g/dL    RDW 13.1 12.3 - 15.4 %    RDW-SD 45.3 37.0 - 54.0 fl    MPV 8.7 6.0 - 12.0 fL    Platelets 270 140 - 450 10*3/mm3    Neutrophil % 68.8 42.7 - 76.0 %    Lymphocyte % 19.4 (L) 19.6 - 45.3 %    Monocyte % 6.5 5.0 - 12.0 %    Eosinophil % 4.4 0.3 - 6.2 %    Basophil % 0.7 0.0 - 1.5 %    Immature Grans % 0.2 0.0 - 0.5 %    Neutrophils, Absolute 4.05 1.70 - 7.00 10*3/mm3    Lymphocytes, Absolute 1.14 0.70 - 3.10 10*3/mm3    Monocytes, Absolute 0.38 0.10 - 0.90 10*3/mm3    Eosinophils, Absolute 0.26 0.00 - 0.40 10*3/mm3    Basophils, Absolute 0.04 0.00 - 0.20 10*3/mm3    Immature Grans, Absolute 0.01 0.00 - 0.05 10*3/mm3    nRBC 0.0 0.0 - 0.2 /100 WBC   High Sensitivity Troponin T    Collection Time: 01/07/25  6:54 PM    Specimen: Arm, Left; Blood   Result Value Ref Range    HS Troponin T 19 <22 ng/L   BNP    Collection Time: 01/07/25  6:54 PM    Specimen: Arm, Left; Blood   Result Value Ref Range    proBNP 2,128.0 (H) 0.0 - 1,800.0 pg/mL   Urinalysis With Microscopic If Indicated (No Culture) - Urine, Clean Catch    Collection Time: 01/07/25  6:59 PM    Specimen: Urine, Clean Catch   Result Value Ref Range    Color, UA Yellow Yellow, Straw    Appearance, UA Clear Clear    pH, UA 6.0 5.0 - 8.0    Specific Gravity, UA 1.006 1.005 - 1.030    Glucose,  UA Negative Negative    Ketones, UA Negative Negative    Bilirubin, UA Negative Negative    Blood, UA Negative Negative    Protein, UA Negative Negative    Leuk Esterase, UA Negative Negative    Nitrite, UA Negative Negative    Urobilinogen, UA 0.2 E.U./dL 0.2 - 1.0 E.U./dL   ECG 12 Lead Chest Pain    Collection Time: 01/07/25  8:50 PM   Result Value Ref Range    QT Interval 456 ms    QTC Interval 460 ms   High Sensitivity Troponin T 1Hr    Collection Time: 01/07/25  9:56 PM    Specimen: Blood   Result Value Ref Range    HS Troponin T 20 <22 ng/L    Troponin T Numeric Delta 1 Abnormal if >/=3 ng/L         RADIOLOGY  US Gallbladder    Result Date: 1/7/2025  GALLBLADDER ULTRASOUND  HISTORY: Gallbladder distention  COMPARISON: January 7, 2025  TECHNIQUE: Grayscale and color Doppler sonographic images were obtained through the right upper quadrant.  FINDINGS: Visualized portions of the pancreas appear unremarkable. Main portal vein is patent with hepatopetal flow. No focal hepatic lesions are seen. There is no intra or extrahepatic biliary dilatation. Common bile duct measures 4 mm. The right kidney measures 7.4 x 4.1 x 4.2 cm. There is no hydronephrosis. Renal echotexture appears normal. As was noted on earlier CT, gallbladder is distended. However, no stones or sludge are seen, and there is no gallbladder wall thickening or pericholecystic fluid. Gallbladder wall measures 2 to 3 mm.      Gallbladder distention, without stones or sludge identified.  This report was finalized on 1/7/2025 10:45 PM by Dr. Monet Lorenzo M.D on Workstation: [id number]      CT Abdomen Pelvis With Contrast    Result Date: 1/7/2025  CT OF THE ABDOMEN PELVIS WITH CONTRAST  HISTORY: Right lower quadrant pain  COMPARISON: None available.  TECHNIQUE: Axial CT imaging was obtained through the abdomen and pelvis. IV contrast was administered.  FINDINGS: Images through the lung bases are clear. No suspicious hepatic lesions are seen. The  stomach, adrenal glands, spleen, and pancreas appear normal. The gallbladder is distended, although I don't see definite stones. There is no intra or extrahepatic biliary dilatation. The patient does have a duodenal diverticulum. The kidneys enhance symmetrically. No hydronephrosis is seen. The prostate gland is absent. There is colonic diverticulosis. There is no bowel obstruction. The appendix is normal. There are aortoiliac calcifications. There is lumbar scoliosis, with convexity to the left.       1. Distention of the gallbladder. I don't see any obvious stones, but consider further assessment with gallbladder ultrasound.  Radiation dose reduction techniques were utilized, including automated exposure control and exposure modulation based on body size.   This report was finalized on 1/7/2025 9:31 PM by Dr. Monet Lorenzo M.D on Workstation: [id number]      XR Chest 1 View    Result Date: 1/7/2025  SINGLE VIEW OF THE CHEST  HISTORY: Chest pain  COMPARISON: None available.  FINDINGS: Heart size is within normal limits. No pneumothorax, pleural effusion, or acute infiltrate is seen. There is calcification of the aorta. There are degenerative changes of the shoulders bilaterally.      No acute findings.  This report was finalized on 1/7/2025 9:03 PM by Dr. Monet Lorenzo M.D on Workstation: [id number]         MEDICATIONS GIVEN IN ER  Medications   sodium chloride 0.9 % flush 10 mL (has no administration in time range)   ketorolac (TORADOL) injection 30 mg (has no administration in time range)   morphine injection 4 mg (has no administration in time range)   labetalol (NORMODYNE,TRANDATE) injection 5 mg (has no administration in time range)   HYDROmorphone (DILAUDID) injection 1 mg (1 mg Intravenous Given 1/7/25 2027)   ondansetron (ZOFRAN) injection 4 mg (4 mg Intravenous Given 1/7/25 2027)   iopamidol (ISOVUE-300) 61 % injection 100 mL (85 mL Intravenous Given by Other 1/7/25 2112)   HYDROmorphone  (DILAUDID) injection 1 mg (1 mg Intravenous Given 1/7/25 2227)         ORDERS PLACED DURING THIS VISIT:  Orders Placed This Encounter   Procedures    CT Abdomen Pelvis With Contrast    XR Chest 1 View    US Gallbladder    [place] Draw    Comprehensive Metabolic Panel    Lipase    Urinalysis With Microscopic If Indicated (No Culture) - Urine, Clean Catch    Lactic Acid, Plasma    CBC Auto Differential    High Sensitivity Troponin T    BNP    High Sensitivity Troponin T 1Hr    NPO Diet NPO Type: Strict NPO    Undress & Gown    ECG 12 Lead Chest Pain    Insert Peripheral IV    Initiate ED Observation Status    CBC & Differential    Green Top (Gel)    Lavender Top    Gold Top - SST    Light Blue Top         OUTPATIENT MEDICATION MANAGEMENT:  Current Facility-Administered Medications Ordered in Epic   Medication Dose Route Frequency Provider Last Rate Last Admin    ketorolac (TORADOL) injection 30 mg  30 mg Intravenous Once Lee Stanley MD        labetalol (NORMODYNE,TRANDATE) injection 5 mg  5 mg Intravenous Once Lee Stanley MD        morphine injection 4 mg  4 mg Intravenous Once Lee Stanley MD        sodium chloride 0.9 % flush 10 mL  10 mL Intravenous PRN Lee Stanley MD         Current Outpatient Medications Ordered in Epic   Medication Sig Dispense Refill    ALPRAZolam (XANAX) 1 MG tablet Take 1 tablet by mouth 3 (Three) Times a Day As Needed for Anxiety. for anxiety 60 tablet 0    labetalol (NORMODYNE) 200 MG tablet Take  by mouth 2 (Two) Times a Day.      meloxicam (MOBIC) 15 MG tablet Take 1 tablet by mouth Daily. 90 tablet 1    simvastatin (ZOCOR) 20 MG tablet TAKE 1 TABLET BY MOUTH EVERY NIGHT 90 tablet 1    Thiamine HCl (vitamin B-1) 50 MG tablet Take 1 tablet by mouth Daily.      triamcinolone (KENALOG) 0.1 % cream APPLY TOPICALLY TO THE AFFECTED AREA ON RASH ON ARMS/LEGS TWICE DAILY AS NEEDED 30 g 1              PROGRESS, DATA ANALYSIS, CONSULTS, AND MEDICAL DECISION MAKING  All labs have been  independently interpreted by me.  All radiology studies have been reviewed by me. All EKG's have been independently viewed and interpreted by me.  Discussion below represents my analysis of pertinent findings related to patient's condition, differential diagnosis, treatment plan and final disposition.    Patient presentation and evaluation most consistent with right upper quadrant abdominal pain with gallbladder distention requiring admission for observation and surgical consultation.    DIFFERENTIAL DIAGNOSIS INCLUDE BUT NOT LIMITED TO:     Differential diagnosis includes but is not limited to:  - Hepatobiliary pathology such as cholecystitis, cholangitis, and symptomatic cholelithiasis  - Pancreatitis  - Dyspepsia  - Small bowel obstruction  - Appendicitis  - Diverticulitis  - UTI including pyelonephritis  - Ureteral stone  - Zoster  - Colitis, including infectious and ischemic  - Atypical ACS      Clinical Scores: N/A      ED Course as of 01/07/25 2306 Tue Jan 07, 2025 2044 Patient is now complaining of some mild chest discomfort.  Describes a sharp sensation in the center of his chest.  No obvious provocative or palliative activity.  Plan for the addition of troponin, BNP, EKG and chest x-ray. [DC]   2148 CT Abdomen Pelvis With Contrast  Radiology report reviewed, distention of the gallbladder, no obvious stones but could further assess with gallbladder ultrasound. [DC-2]   2236 Per my independent interpretation of the chest x-ray, there is no obvious pneumothorax. [DC]   2304 I discussed the case with [facility] with KARL at this time regarding the patient.  I discussed work-up, results, concerns.  I discussed the consulting provider's desire for obs admit.    [DC]      ED Course User Index  [DC] Dipesh Meneses PA  [DC-2] Lee Stanley MD            2306 I rechecked the patient.  I discussed the patient's labs, radiology findings (including all incidental findings), diagnosis, and plan for admission. The  patient understands and agrees with the plan.      AS OF 23:06 EST VITALS:    BP - (!) 186/88  HR - 53  TEMP - 98 °F (36.7 °C)  O2 SATS - 95%    COMPLEXITY OF CARE  The patient requires admission.      DIAGNOSIS  Final diagnoses:   RUQ abdominal pain   Gallbladder dilatation         DISPOSITION  ED Disposition       ED Disposition   Decision to Admit    Condition   --    Comment   --                Please note that portions of this document were completed with a voice recognition program.    Note Disclaimer: At [facility], we believe that sharing information builds trust and better relationships. You are receiving this note because you recently visited [facility]. It is possible you will see health information before a provider has talked with you about it. This kind of information can be easy to misunderstand. To help you fully understand what it means for your health, we urge you to discuss this note with your provider.         Dipesh Meneses PA  01/10/25 1442

## 2025-01-08 NOTE — PROGRESS NOTES
ED OBSERVATION PROGRESS/DISCHARGE SUMMARY    Date of Admission: 1/7/2025   LOS: 0 days   PCP: Frank Madison MD      Subjective   Patient states he still having significant abdominal pain more in the right lower quadrant this evening.  He states he threw up earlier and his pain significantly worsened after trying to eat something earlier this evening.  He reports nausea.  He states he is feeling very uncomfortable about going home without resolution of his issues stating that prior to coming into the ER he could barely get up to get around his house and he lives alone thus having to call EMS.  He denies pain with urination or change in frequency.  He reports he has had difficulty with constipation and takes MiraLAX daily in order to have a bowel movement.    Hospital Outcome:     Patient is an afebrile 79-year-old  gentleman admitted to the ED observation for right upper quadrant abdominal discomfort.  Imaging in the ED shows distended gallbladder without evidence of stones or sludge.  This a.m. he has no transaminitis and his bilirubin is 0.5.  He had 2 negative high-sensitivity troponins and his chest x-ray showed no acute findings.  He endorsed some shortness of breath, advised similar to when EMS given a breathing treatment yesterday, will schedule some DuoNebs.  He is a former smoker, quit about 13 years ago.    This morning he endorses persistent right upper quadrant abdominal discomfort.  He has been n.p.o. since midnight, scheduled for HIDA scan this a.m.  Awaiting general surgery consultation.    1/8/25:  Patient underwent HIDA scan which showed no common duct or cystic obstruction.  Ejection fraction approximately 10% which could be associated with biliary dyskinesia or chronic cholecystitis.  I discussed findings with Dr. Pitts who recommends against any surgical intervention as patient complaining of right lower quadrant pain and right upper quadrant pain intermittently over the last 6  months.  He is afebrile.  His labs are reassuring.  He is no transaminitis or elevation of his serum bilirubin noticed he have any bilirubin spilling in his urine.    Patient reporting chest discomfort after returning from HIDA scan.  He had 2 negative high-sensitivity troponins yesterday.  His repeat EKG this afternoon showed sinus rhythm rate of 81 with QTc 462 and no evidence of acute ischemia.  His repeat troponin is is normal at 21.  Low suspicion for cardiac etiology.    Patient states his pain significantly worsened after eating and he has been having significant nausea and had episode of vomiting earlier.  He reports that the pain has kind of moved into his right lower abdomen and feels like a severe intermittent cramping sensation followed by soreness.  Patient and daughter at bedside are very uneasy about patient being discharged with continued symptoms.  Patient had a colonoscopy done in June 2024 that showed external and internal hemorrhoids as well as a tortuous colon otherwise normal.    ROS:  General: no fevers, chills  Respiratory: no cough, dyspnea  Cardiovascular: no chest pain, palpitations  Abdomen: No abdominal pain, nausea, vomiting, or diarrhea  Neurologic: No focal weakness    Objective   Physical Exam:  I have reviewed the vital signs.  Temp:  [97.7 °F (36.5 °C)-98 °F (36.7 °C)] 97.9 °F (36.6 °C)  Heart Rate:  [53-84] 66  Resp:  [18-20] 18  BP: (137-186)/(72-98) 147/92  General Appearance:    Alert, cooperative, no distress appears older than stated age  Head:    Normocephalic, atraumatic  Eyes:    Sclerae anicteric  Neck:   Supple, no mass  Lungs: Mild expiratory wheeze to bilateral posterior and anterior upper lung fields, otherwise clear, able to speak in full sentences without hint of distress, respirations unlabored  Heart: Regular rate and rhythm, S1 and S2 normal, no murmur, rub or gallop  Abdomen:  Soft, moderate right upper quadrant tenderness to palpation with guarding, otherwise  soft and nontender exam, bowel sounds active, nondistended  Extremities: No clubbing, cyanosis, or edema to lower extremities  Pulses:  2+ and symmetric in distal lower extremities  Skin: No rashes   Neurologic: Oriented x3, Normal strength to extremities    Results Review:    I have reviewed the labs, radiology results and diagnostic studies.    Results from last 7 days   Lab Units 01/08/25  0152   WBC 10*3/mm3 5.70   HEMOGLOBIN g/dL 11.4*   HEMATOCRIT % 35.8*   PLATELETS 10*3/mm3 230     Results from last 7 days   Lab Units 01/08/25  0152 01/07/25  1854   SODIUM mmol/L 136 136   POTASSIUM mmol/L 5.0 5.0   CHLORIDE mmol/L 104 104   CO2 mmol/L 21.6* 21.9*   BUN mg/dL 17 16   CREATININE mg/dL 1.09 1.20   CALCIUM mg/dL 9.4 9.6   BILIRUBIN mg/dL 0.5 0.5   ALK PHOS U/L 76 80   ALT (SGPT) U/L 17 18   AST (SGOT) U/L 22 24   GLUCOSE mg/dL 116* 97     Imaging Results (Last 24 Hours)       Procedure Component Value Units Date/Time    NM HIDA SCAN WITH PHARMACOLOGICAL INTERVENTION [[id number]] Collected: 01/08/25 1506     Updated: 01/08/25 1523    Narrative:      NUCLEAR MEDICINE HIDA SCAN WITH PHARMACOLOGIC INTERVENTION     HISTORY: Right upper quadrant pain     TECHNIQUE: 60 minute anterior dynamic imaging over the abdomen was  performed after injection of 12.1 mCi Tc-choletec IV. 1.2 mcg Kinevac  was administered IV to determine gallbladder ejection fraction.     FINDINGS: There is normal hepatic uptake and excretion with appropriate  clearance of background blood pool activity. There is normal  visualization of biliary tract, gallbladder, small bowel. After CCK  infusion, gallbladder ejection fraction is calculated to be 10%  (normal  range is considered 35% or greater)       Impression:      1. No common duct or cystic duct obstruction.  2. Gallbladder ejection fraction measures 10% which is abnormally low  and this may be associated with biliary dyskinesia or chronic  cholecystitis..     This report was finalized on  1/8/2025 3:17 PM by Kj Buenrostro M.D on  Workstation: [id number]       US Gallbladder [[id number]] Collected: 01/07/25 2243     Updated: 01/07/25 2249    Narrative:      GALLBLADDER ULTRASOUND     HISTORY: Gallbladder distention     COMPARISON: January 7, 2025     TECHNIQUE: Grayscale and color Doppler sonographic images were obtained  through the right upper quadrant.     FINDINGS:  Visualized portions of the pancreas appear unremarkable. Main portal  vein is patent with hepatopetal flow. No focal hepatic lesions are seen.  There is no intra or extrahepatic biliary dilatation. Common bile duct  measures 4 mm. The right kidney measures 7.4 x 4.1 x 4.2 cm. There is no  hydronephrosis. Renal echotexture appears normal. As was noted on  earlier CT, gallbladder is distended. However, no stones or sludge are  seen, and there is no gallbladder wall thickening or pericholecystic  fluid. Gallbladder wall measures 2 to 3 mm.       Impression:      Gallbladder distention, without stones or sludge identified.     This report was finalized on 1/7/2025 10:45 PM by Dr. Monet Lorenzo M.D on Workstation: [id number]       CT Abdomen Pelvis With Contrast [[id number]] Collected: 01/07/25 2127     Updated: 01/07/25 2134    Narrative:      CT OF THE ABDOMEN PELVIS WITH CONTRAST     HISTORY: Right lower quadrant pain     COMPARISON: None available.     TECHNIQUE: Axial CT imaging was obtained through the abdomen and pelvis.  IV contrast was administered.     FINDINGS:  Images through the lung bases are clear. No suspicious hepatic lesions  are seen. The stomach, adrenal glands, spleen, and pancreas appear  normal. The gallbladder is distended, although I don't see definite  stones. There is no intra or extrahepatic biliary dilatation. The  patient does have a duodenal diverticulum. The kidneys enhance  symmetrically. No hydronephrosis is seen. The prostate gland is absent.  There is colonic diverticulosis. There is no bowel  obstruction. The  appendix is normal. There are aortoiliac calcifications. There is lumbar  scoliosis, with convexity to the left.       Impression:         1. Distention of the gallbladder. I don't see any obvious stones, but  consider further assessment with gallbladder ultrasound.     Radiation dose reduction techniques were utilized, including automated  exposure control and exposure modulation based on body size.        This report was finalized on 1/7/2025 9:31 PM by Dr. Monet Lorenzo M.D on Workstation: [id number]       XR Chest 1 View [[id number]] Collected: 01/07/25 2103     Updated: 01/07/25 2106    Narrative:      SINGLE VIEW OF THE CHEST     HISTORY: Chest pain     COMPARISON: None available.     FINDINGS:  Heart size is within normal limits. No pneumothorax, pleural effusion,  or acute infiltrate is seen. There is calcification of the aorta. There  are degenerative changes of the shoulders bilaterally.       Impression:      No acute findings.     This report was finalized on 1/7/2025 9:03 PM by Dr. Monet Lorenzo M.D on Workstation: [id number]               I have reviewed the medications.     Discharge Medications        New Medications        Instructions Start Date   albuterol sulfate  (90 Base) MCG/ACT inhaler  Commonly known as: PROVENTIL HFA;VENTOLIN HFA;PROAIR HFA   2 puffs, Inhalation, Every 4 Hours PRN      ondansetron ODT 4 MG disintegrating tablet  Commonly known as: ZOFRAN-ODT   4 mg, Translingual, Every 8 Hours PRN             Continue These Medications        Instructions Start Date   ALPRAZolam 1 MG tablet  Commonly known as: XANAX   1 mg, Oral, 3 Times Daily PRN, for anxiety      labetalol 200 MG tablet  Commonly known as: NORMODYNE   2 Times Daily      simvastatin 20 MG tablet  Commonly known as: ZOCOR   20 mg, Oral, Nightly      triamcinolone 0.1 % cream  Commonly known as: KENALOG   APPLY TOPICALLY TO THE AFFECTED AREA ON RASH ON ARMS/LEGS TWICE DAILY AS NEEDED       vitamin B-1 50 MG tablet   50 mg, Daily             Stop These Medications      meloxicam 15 MG tablet  Commonly known as: MOBIC             ---------------------------------------------------------------------------------------------  Assessment & Plan   Assessment/Problem List    RUQ pain      Plan:      Right upper quadrant abdominal pain  -CT abdomen and gallbladder ultrasound notes gallbladder distention without stones or sludge identified  -Normal LFTs, T. bili 0.5  -HIDA scan shows no common duct or cystic duct obstruction, ejection fraction 10%  -General Surgery consulted, no surgical intervention recommended acutely, clear for outpatient follow-up  -Analgesics and antiemetics as needed  -EKG nonischemic, troponin negative x 3.  19, 20, 21.  -Patient having worsening pain and nausea and vomiting with oral intake  -Clear liquid diet n.p.o. after midnight  -Consult GI     Hypertension  -Continue labetalol  -Monitor vital signs every 4 hours    Wheezing  -Uncertain of history of COPD, former smoker  -Ventolin inhaler discharge    Disposition: Supportive treatment overnight, awaiting any further recommendations from GI team.  Suspect discharge if symptoms improve overnight        Ermelinda Griffin PA-C 01/08/25 20:09 EST    I have worn appropriate PPE during this patient encounter, sanitized my hands both with entering and exiting patient's room.      60 minutes has been spent by [facility] providers in the care of this patient while under observation status

## 2025-01-08 NOTE — H&P
Psychiatric   HISTORY AND PHYSICAL    Patient Name: Chaka Paul  : 1945  MRN: [id number]  Primary Care Physician:  Frank Madison MD  Date of admission: 2025    Subjective   Subjective     Chief Complaint:   Chief Complaint   Patient presents with    Abdominal Pain         HPI:    Chaka Paul is a 79 y.o. male with a past medical history including but not limited to hypertension, hyperlipidemia, anxiety who presents to [facility] ER with right upper abdominal pain.  Patient states the pain started about 3 weeks ago however yesterday morning the pain significantly worsened.  He states that the patient is mainly in the right upper abdomen and will sometimes radiate around to the back.  He is reported some nausea.  Patient denies aggravating factors including eating.  He states he has been dealing with upper respiratory and flu symptoms for a couple of weeks that resolved 1 week ago but the abdominal pain continued.  Reports couple episodes of loose nonbloody stools have since resolved.  Denies chest pain shortness of breath.  Denies pain with urination or change in frequency.  Denies lower extremity swelling.    Review of Systems   All systems were reviewed and negative except for: what is mentioned above in the HPI.     Personal History     Past Medical History:   Diagnosis Date    Anxiety     Arthritis     Atelectasis     Cancer     prostate     Colon polyps     Elevated cholesterol     Hypertension     Visual impairment        Past Surgical History:   Procedure Laterality Date    COLONOSCOPY      COLONOSCOPY N/A 2024    Procedure: COLONOSCOPY to cecum and t.i.;  Surgeon: Arnol Ramirez MD;  Location: [facility] ENDOSCOPY;  Service: Gastroenterology;  Laterality: N/A;  pre- rectal bleeding and hx polyps  post- hemorrhoids    PROSTATE SURGERY      TONSILLECTOMY         Family History: family history includes Blindness in his brother and sister; Cancer in his  father; Dementia in his mother; No Known Problems in his brother and brother; Other in his mother; Seizures in his sister. Otherwise pertinent FHx was reviewed and not pertinent to current issue.    Social History:  reports that he has quit smoking. He has never used smokeless tobacco. He reports that he does not currently use alcohol. He reports that he does not use drugs.    Home Medications:  ALPRAZolam, labetalol, meloxicam, simvastatin, triamcinolone, and vitamin B-1    Allergies:  No Known Allergies    Objective   Objective     Vitals:   Temp:  [97.7 °F (36.5 °C)-98 °F (36.7 °C)] 97.7 °F (36.5 °C)  Heart Rate:  [53-79] 53  Resp:  [20-30] 20  BP: (169-201)/() 172/95  Physical Exam    Constitutional: 79-year-old male in no acute distress on room air   Eyes: PERRLA, sclerae anicteric, no conjunctival injection   HENT: NCAT, mucous membranes moist   Neck: Supple, no thyromegaly, no lymphadenopathy, trachea midline   Respiratory: Clear to auscultation bilaterally, nonlabored respirations    Cardiovascular: RRR, no murmurs, rubs, or gallops, palpable pedal pulses bilaterally   Gastrointestinal: Positive bowel sounds, soft, right upper quadrant tenderness to palpation with guarding, positive Krause's, nondistended   Musculoskeletal: No bilateral ankle edema, no clubbing or cyanosis to extremities   Psychiatric: Appropriate affect, cooperative   Neurologic: Oriented x 3, strength symmetric in all extremities, Cranial Nerves grossly intact to confrontation, speech clear   Skin: No rashes     Result Review    Result Review:  I have personally reviewed the results from the time of this admission to 1/8/2025 00:52 EST and agree with these findings:  [x]  Laboratory list / accordion  []  Microbiology  [x]  Radiology  []  EKG/Telemetry   []  Cardiology/Vascular   []  Pathology  [x]  Old records  []  Other:  Most notable findings include:     US Gallbladder    Result Date: 1/7/2025  GALLBLADDER ULTRASOUND  HISTORY:  Gallbladder distention  COMPARISON: January 7, 2025  TECHNIQUE: Grayscale and color Doppler sonographic images were obtained through the right upper quadrant.  FINDINGS: Visualized portions of the pancreas appear unremarkable. Main portal vein is patent with hepatopetal flow. No focal hepatic lesions are seen. There is no intra or extrahepatic biliary dilatation. Common bile duct measures 4 mm. The right kidney measures 7.4 x 4.1 x 4.2 cm. There is no hydronephrosis. Renal echotexture appears normal. As was noted on earlier CT, gallbladder is distended. However, no stones or sludge are seen, and there is no gallbladder wall thickening or pericholecystic fluid. Gallbladder wall measures 2 to 3 mm.      Gallbladder distention, without stones or sludge identified.  This report was finalized on 1/7/2025 10:45 PM by Dr. Monet Lorenzo M.D on Workstation: [id number]      CT Abdomen Pelvis With Contrast    Result Date: 1/7/2025  CT OF THE ABDOMEN PELVIS WITH CONTRAST  HISTORY: Right lower quadrant pain  COMPARISON: None available.  TECHNIQUE: Axial CT imaging was obtained through the abdomen and pelvis. IV contrast was administered.  FINDINGS: Images through the lung bases are clear. No suspicious hepatic lesions are seen. The stomach, adrenal glands, spleen, and pancreas appear normal. The gallbladder is distended, although I don't see definite stones. There is no intra or extrahepatic biliary dilatation. The patient does have a duodenal diverticulum. The kidneys enhance symmetrically. No hydronephrosis is seen. The prostate gland is absent. There is colonic diverticulosis. There is no bowel obstruction. The appendix is normal. There are aortoiliac calcifications. There is lumbar scoliosis, with convexity to the left.       1. Distention of the gallbladder. I don't see any obvious stones, but consider further assessment with gallbladder ultrasound.  Radiation dose reduction techniques were utilized, including  automated exposure control and exposure modulation based on body size.   This report was finalized on 1/7/2025 9:31 PM by Dr. Monet Lorenzo M.D on Workstation: [id number]      XR Chest 1 View    Result Date: 1/7/2025  SINGLE VIEW OF THE CHEST  HISTORY: Chest pain  COMPARISON: None available.  FINDINGS: Heart size is within normal limits. No pneumothorax, pleural effusion, or acute infiltrate is seen. There is calcification of the aorta. There are degenerative changes of the shoulders bilaterally.      No acute findings.  This report was finalized on 1/7/2025 9:03 PM by Dr. Monet Lorenzo M.D on Workstation: [email or web address]            Assessment & Plan   Assessment / Plan     Brief Patient Summary:  Chaka Paul is a 79 y.o. male who recently admitted the observation unit for further evaluation of intractable right upper quadrant pain.  In the ED, CT abdomen and pelvis with contrast and gallbladder ultrasound both notes gallbladder distention without stones or sludge identified.  A chest x-ray showed no acute findings.  Lab work notes normal LFTs with a total bilirubin at 0.5. Patient required multiple doses of analgesics for pain control.  HIDA scan ordered for further evaluation.  General surgery consulted.    Active Hospital Problems:  Active Hospital Problems    Diagnosis     **RUQ pain      Plan:     Right upper quadrant abdominal pain  -CT abdomen and gallbladder ultrasound notes gallbladder distention without stones or sludge identified  -Normal LFTs, T. bili 0.5  -HIDA scan ordered  -General Surgery consulted  -Analgesics and antiemetics as needed  -N.p.o.    Hypertension  -Continue labetalol  -Monitor vital signs every 4 hours      VTE Prophylaxis:  Mechanical VTE prophylaxis orders are present.        CODE STATUS:    Level Of Support Discussed With: Patient  Code Status (Patient has no pulse and is not breathing): No CPR (Do Not Attempt to Resuscitate)  Medical Interventions (Patient has pulse or  is breathing): Full Support    Admission Status:  I believe this patient meets observation status.    77 minutes have been spent by [facility] providers in the care of this patient while under observation status.      Appropriate PPE worn during patient encounter.  Hand hygeine performed before and after seeing the patient.      Electronically signed by Ermelinda Griffin PA-C, 01/08/25, 12:52 AM EST.

## 2025-01-08 NOTE — ED PROVIDER NOTES
Pt presents to the ED c/o right lower quadrant abdominal pain since 9 AM this morning.  Feels similar to prior diverticulitis.  Nausea but no vomiting.  Had some loose stools last week but those have since resolved.  No urinary difficulties.     On exam,   General: Appears uncomfortable, nontoxic, nondiaphoretic  HEENT: EOMI  Pulm: Symmetric chest rise, nonlabored breathing  CV: Regular rate and rhythm  GI: Nondistended, mild right lower quadrant tenderness to palpation  MSK: No deformity  Skin: Warm, dry  Neuro: Awake, alert, oriented x 4, moving all extremities, no focal deficits  Psych: Calm, cooperative    Vital signs and nursing notes reviewed.           EKG - Per my independent interpretation at 2058:    EKG Time: 2050  Rhythm/Rate: Sinus rhythm with rate of 61  Borderline left axis deviation  Nonspecific IVCD with a QRS of 115  Borderline repolarization abnormalities   No STEMI       No emergent changes as compared to Feb 10, 2023      Plan: Initial concern for colitis, diverticulitis, urolithiasis with renal colic, cholelithiasis, cholecystitis, constipation, UTI, among others.  Plan for labs, CT scan, supportive care, and reevaluation with results.    ED Course as of 01/07/25 2309   Tue Jan 07, 2025 2044 Patient is now complaining of some mild chest discomfort.  Describes a sharp sensation in the center of his chest.  No obvious provocative or palliative activity.  Plan for the addition of troponin, BNP, EKG and chest x-ray. [DC]   2148 CT Abdomen Pelvis With Contrast  Radiology report reviewed, distention of the gallbladder, no obvious stones but could further assess with gallbladder ultrasound. [DC-2]   2236 Per my independent interpretation of the chest x-ray, there is no obvious pneumothorax. [DC]   2304 I discussed the case with [facility] with KARL at this time regarding the patient.  I discussed work-up, results, concerns.  I discussed the consulting provider's desire for obs admit.    [DC]      ED  Course User Index  [DC] Dipesh Meneses PA  [DC-2] Lee Stanley MD       No overtly emergent finding, ongoing pain, questionable gallbladder findings although pain seems to be lower than that.  Will plan for hospitalization for further symptom control, surgical consult, possible HIDA scan.     MD Attestation Note    SHARED VISIT: This visit was performed by BOTH a physician and an APC. The substantive portion of the medical decision making was performed by this attesting physician who made or approved the management plan and takes responsibility for patient management. All studies in the APC note (if performed) were independently interpreted by me.                   Lee Stanley MD  01/08/25 0100

## 2025-01-08 NOTE — CONSULTS
General Surgery Consult    Summary:    Mr. Chaka Paul is a 79 y.o. year old gentleman who presents to the ER with right-sided abdominal pain worsened by shoveling snow this week.  CT scan and ultrasound with no evidence of gallstones or sludge.  HIDA scan pending but on my review does not appear consistent with cholecystitis.  His symptoms do not sound particularly biliary in nature.  Will follow-up HIDA scan but unless evidence of acute cholecystitis, would not proceed with cholecystectomy at this time.  He is okay for diet from general surgery standpoint.    Chief Complaint:    Abdominal pain     History of Present Illness:    Mr. Chaka Paul is a 79 y.o. year old gentleman who presented to the ER last night with right-sided abdominal pain for the last few months.  He states he is having right lower quadrant and some right upper quadrant pain for the last 2 months.  He states this was exacerbated by shoveling snow a few days ago.  He denies nausea or vomiting.  He states it is not related to eating.  He complains of some swelling in his right lower quadrant.    Past Medical History:   HLD  HTN    Past Surgical History:    Past Surgical History:   Procedure Laterality Date    COLONOSCOPY  2004    COLONOSCOPY N/A 6/27/2024    Procedure: COLONOSCOPY to cecum and t.i.;  Surgeon: Arnol Ramirez MD;  Location: [facility] ENDOSCOPY;  Service: Gastroenterology;  Laterality: N/A;  pre- rectal bleeding and hx polyps  post- hemorrhoids    PROSTATE SURGERY      TONSILLECTOMY       Family History:    Father with colon cancer    Social History:    Denies tobacco use  Denies alcohol use    Allergies:   No Known Allergies    Medications:     Current Facility-Administered Medications:     acetaminophen (TYLENOL) tablet 650 mg, 650 mg, Oral, Q4H PRN, 650 mg at 01/08/25 0039 **OR** acetaminophen (TYLENOL) 160 MG/5ML oral solution 650 mg, 650 mg, Oral, Q4H PRN **OR** acetaminophen (TYLENOL) suppository 650 mg, 650 mg,  Rectal, Q4H PRN, Dipesh Duran MD    ALPRAZolam (XANAX) tablet 1 mg, 1 mg, Oral, TID PRN, Dipesh Duran MD, 1 mg at 01/08/25 0629    HYDROmorphone (DILAUDID) injection 1 mg, 1 mg, Intravenous, Q2H PRN, 1 mg at 01/08/25 1254 **AND** naloxone (NARCAN) injection 0.4 mg, 0.4 mg, Intravenous, Q5 Min PRN, Ermelinda Griffin, JAME    ipratropium-albuterol (DUO-NEB) nebulizer solution 3 mL, 3 mL, Nebulization, 4x Daily - RT, Ariella Zepeda, APRN, 3 mL at 01/08/25 1440    labetalol (NORMODYNE) tablet 200 mg, 200 mg, Oral, BID, KennerDipesh underwood MD, 200 mg at 01/08/25 1255    ondansetron ODT (ZOFRAN-ODT) disintegrating tablet 4 mg, 4 mg, Oral, Q6H PRN **OR** ondansetron (ZOFRAN) injection 4 mg, 4 mg, Intravenous, Q6H PRN, Dipesh Duran MD, 4 mg at 01/08/25 1436    oxyCODONE-acetaminophen (PERCOCET) 5-325 MG per tablet 1 tablet, 1 tablet, Oral, Q8H PRN, Dipesh Duran MD    sodium chloride 0.9 % flush 10 mL, 10 mL, Intravenous, PRN, Lee Stanley MD    sodium chloride 0.9 % flush 10 mL, 10 mL, Intravenous, Q12H, Dipesh Duran MD, 10 mL at 01/08/25 1255    sodium chloride 0.9 % flush 10 mL, 10 mL, Intravenous, PRN, Dipesh Duran MD    sodium chloride 0.9 % infusion 40 mL, 40 mL, Intravenous, PRN, Dipesh Duran MD    thiamine (VITAMIN B-1) tablet 50 mg, 50 mg, Oral, Daily, Dipesh Duran MD, 50 mg at 01/08/25 1255    Radiology/Endoscopy:    CT abdomen pelvis at this time gallbladder, no stones  Right upper quadrant; gallbladder distention, no stones or sludge    Labs:    WBC 5.7 Hgb 11.4 platelets 230 Cr 1.09    Review of Systems:   Influenza-like illness: no fever, no  cough, no  sore throat, no  body aches, no loss of sense of taste or smell, no known exposure to person with Covid-19.  Constitutional: Negative for fevers or chills  HENT: Negative for hearing loss or runny nose  Eyes: Negative for vision changes or scleral icterus  Respiratory: Negative for cough or shortness of breath  Cardiovascular:  Negative for chest pain or heart palpitations  Gastrointestinal: + for abdominal pain, denies nausea, vomiting, constipation, melena, or hematochezia  Genitourinary: Negative for hematuria or dysuria  Musculoskeletal: Negative for joint swelling or gait instability  Neurologic: Negative for tremors or seizures  Psychiatric: Negative for suicidal ideations or depression  All other systems reviewed and negative    Physical Exam:   Constitutional: Well-developed, well-nourished, no acute distress  Vital signs: Mild tachycardia, blood pressure 137/87 oxygen 100%.  Eyes:  Conjunctivae normal, sclerae nonicteric  ENMT: Hearing grossly normal, oral mucosa moist  Neck: Supple, trachea midline  Respiratory: Clear to auscultation, normal inspiratory effort  Cardiovascular: Regular rate, no peripheral edema, no jugular venous distention  Gastrointestinal: Soft, nontender, distended  Skin:  Warm, dry, no rash on visualized skin surfaces  Musculoskeletal: Symmetric strength, normal gait  Psychiatric: Alert and oriented ×3, normal affect     ELSY CHRIS M.D.  General and Endoscopic Surgery  [facility]    [address], [address]  [place], [place], [address]  P: [phone]  F: [phone]

## 2025-01-08 NOTE — PLAN OF CARE
Problem: Adult Inpatient Plan of Care  Goal: Plan of Care Review  Outcome: Progressing  Flowsheets (Taken 1/8/2025 0042)  Progress: no change  Outcome Evaluation: Patient admitted with RLQ pain that hes been having for 2-3 weeks. CT/US shows distention but no gallstones/sludge. Hida scan orderd. Gen Sx consulted. Dilaudid q2h. Percocet q8h. NPO  Plan of Care Reviewed With: patient  Goal: Patient-Specific Goal (Individualized)  Outcome: Progressing  Goal: Absence of Hospital-Acquired Illness or Injury  Outcome: Progressing  Intervention: Identify and Manage Fall Risk  Recent Flowsheet Documentation  Taken 1/8/2025 0015 by Edmund Schultz RN  Safety Promotion/Fall Prevention:   activity supervised   clutter free environment maintained  Intervention: Prevent Skin Injury  Recent Flowsheet Documentation  Taken 1/8/2025 0015 by Edmund Schultz RN  Body Position: position changed independently  Intervention: Prevent and Manage VTE (Venous Thromboembolism) Risk  Recent Flowsheet Documentation  Taken 1/8/2025 0015 by Edmund Schultz RN  VTE Prevention/Management: patient refused intervention  Intervention: Prevent Infection  Recent Flowsheet Documentation  Taken 1/8/2025 0015 by Edmund Schultz RN  Infection Prevention:   single patient room provided   rest/sleep promoted  Goal: Optimal Comfort and Wellbeing  Outcome: Progressing  Intervention: Monitor Pain and Promote Comfort  Recent Flowsheet Documentation  Taken 1/8/2025 0015 by Edmund Schultz RN  Pain Management Interventions:   care clustered   pain management plan reviewed with patient/caregiver   pain medication given   relaxation techniques promoted  Intervention: Provide Person-Centered Care  Recent Flowsheet Documentation  Taken 1/8/2025 0015 by Edmund Schultz RN  Trust Relationship/Rapport:   care explained   choices provided  Goal: Readiness for Transition of Care  Outcome: Progressing  Intervention: Mutually Develop Transition Plan  Recent Flowsheet Documentation  Taken  1/8/2025 0003 by Edmund Schultz RN  Equipment Currently Used at Home: none  Transportation Anticipated: car, drives self  Patient/Family Anticipated Services at Transition: none  Patient/Family Anticipates Transition to: home  Taken 1/8/2025 0002 by Edmund Schultz RN  Equipment Currently Used at Home: none     Problem: Pain Acute  Goal: Optimal Pain Control and Function  Outcome: Progressing  Intervention: Optimize Psychosocial Wellbeing  Recent Flowsheet Documentation  Taken 1/8/2025 0015 by Edmund Schultz RN  Diversional Activities: television  Intervention: Develop Pain Management Plan  Recent Flowsheet Documentation  Taken 1/8/2025 0015 by Edmund Schultz RN  Pain Management Interventions:   care clustered   pain management plan reviewed with patient/caregiver   pain medication given   relaxation techniques promoted  Intervention: Prevent or Manage Pain  Recent Flowsheet Documentation  Taken 1/8/2025 0015 by Edmund Schultz RN  Medication Review/Management: medications reviewed     Problem: Nausea and Vomiting  Goal: Nausea and Vomiting Relief  Outcome: Progressing   Goal Outcome Evaluation:  Plan of Care Reviewed With: patient        Progress: no change  Outcome Evaluation: Patient admitted with RLQ pain that hes been having for 2-3 weeks. CT/US shows distention but no gallstones/sludge. Hida scan orderd. Gen Sx consulted. Dilaudid q2h. Percocet q8h. NPO

## 2025-01-08 NOTE — PROGRESS NOTES
KARL RODRIGUEZ Attestation Note     I supervised care provided by the midlevel provider. We have discussed this patient's history, physical exam, and treatment plan. I have reviewed the midlevel provider's note and I agree with the midlevel provider's findings and plan of care.   SHARED VISIT: This visit was performed by BOTH a physician and an APC. The substantive portion of the medical decision making was performed by this attesting physician who made or approved the management plan and takes responsibility for patient management. All studies in the APC note (if performed) were independently interpreted by me.   I have personally had a face to face encounter with the patient.   My personal findings are documented below:      Subjective  Pt is a 79 y.o. male admitted from Emergency Department for evaluation and treatment of right upper quadrant pain ongoing over several weeks.  Patient does describe generalized abdominal pain which has been off and on over 1 year.  Also describes intermittent swelling in the right lower quadrant that comes and goes.    Physical Exam  GENERAL: Alert and in NAD, Vitals reviewed-afebrile.  Heart rate blood pressure and O2 sats unremarkable  HENT: nares patent  EYES: no scleral icterus  CV: regular rhythm, regular rate-no murmur  RESPIRATORY: normal effort, clear to auscultation bilaterally  ABDOMEN: soft, mild right upper abdominal tenderness without rebound or guarding  MUSCULOSKELETAL: no deformity  NEURO: Strength sensation and coordination are grossly intact.  Speech and mentation are unremarkable  SKIN: warm, dry    Assessment/Plan  I discussed tx and evaluation of this patient with LEAH Zepeda.  CT scan and ultrasound do note distention of the gallbladder but no obvious signs of acute cholecystitis.  Labs relatively unremarkable.  Awaiting general surgery evaluation and recommendations.  Also have ordered HIDA scan which is yet to be completed.  If surgery does not feel that this  is gallbladder in nature, may need to consult general gastroenterology.

## 2025-01-08 NOTE — CONSULTS
Chap visited with pt to complete an advance directive.  Pt wants his daughter, Zenobia (legal name is Hazel) to be healthcare surrogate.  Advance directive was placed in chart. Thanks was expressed. Chap remains available.

## 2025-01-09 VITALS
RESPIRATION RATE: 18 BRPM | HEART RATE: 82 BPM | SYSTOLIC BLOOD PRESSURE: 165 MMHG | TEMPERATURE: 97.9 F | HEIGHT: 71 IN | BODY MASS INDEX: 18.03 KG/M2 | WEIGHT: 128.8 LBS | OXYGEN SATURATION: 97 % | DIASTOLIC BLOOD PRESSURE: 77 MMHG

## 2025-01-09 LAB
ALBUMIN SERPL-MCNC: 3.8 G/DL (ref 3.5–5.2)
ALBUMIN/GLOB SERPL: 1.9 G/DL
ALP SERPL-CCNC: 66 U/L (ref 39–117)
ALT SERPL W P-5'-P-CCNC: 16 U/L (ref 1–41)
ANION GAP SERPL CALCULATED.3IONS-SCNC: 12 MMOL/L (ref 5–15)
AST SERPL-CCNC: 17 U/L (ref 1–40)
BILIRUB SERPL-MCNC: 0.5 MG/DL (ref 0–1.2)
BUN SERPL-MCNC: 25 MG/DL (ref 8–23)
BUN/CREAT SERPL: 23.1 (ref 7–25)
CALCIUM SPEC-SCNC: 9.2 MG/DL (ref 8.6–10.5)
CHLORIDE SERPL-SCNC: 102 MMOL/L (ref 98–107)
CO2 SERPL-SCNC: 24 MMOL/L (ref 22–29)
CREAT SERPL-MCNC: 1.08 MG/DL (ref 0.76–1.27)
DEPRECATED RDW RBC AUTO: 45.9 FL (ref 37–54)
EGFRCR SERPLBLD CKD-EPI 2021: 70.7 ML/MIN/1.73
ERYTHROCYTE [DISTWIDTH] IN BLOOD BY AUTOMATED COUNT: 13.1 % (ref 12.3–15.4)
GLOBULIN UR ELPH-MCNC: 2 GM/DL
GLUCOSE SERPL-MCNC: 87 MG/DL (ref 65–99)
HCT VFR BLD AUTO: 34.1 % (ref 37.5–51)
HGB BLD-MCNC: 11.4 G/DL (ref 13–17.7)
MCH RBC QN AUTO: 32.6 PG (ref 26.6–33)
MCHC RBC AUTO-ENTMCNC: 33.4 G/DL (ref 31.5–35.7)
MCV RBC AUTO: 97.4 FL (ref 79–97)
PLATELET # BLD AUTO: 238 10*3/MM3 (ref 140–450)
PMV BLD AUTO: 8.6 FL (ref 6–12)
POTASSIUM SERPL-SCNC: 5.1 MMOL/L (ref 3.5–5.2)
PROT SERPL-MCNC: 5.8 G/DL (ref 6–8.5)
QT INTERVAL: 398 MS
QT INTERVAL: 456 MS
QTC INTERVAL: 460 MS
QTC INTERVAL: 462 MS
RBC # BLD AUTO: 3.5 10*6/MM3 (ref 4.14–5.8)
SODIUM SERPL-SCNC: 138 MMOL/L (ref 136–145)
WBC NRBC COR # BLD AUTO: 5.35 10*3/MM3 (ref 3.4–10.8)

## 2025-01-09 PROCEDURE — 85027 COMPLETE CBC AUTOMATED: CPT | Performed by: STUDENT IN AN ORGANIZED HEALTH CARE EDUCATION/TRAINING PROGRAM

## 2025-01-09 PROCEDURE — G0378 HOSPITAL OBSERVATION PER HR: HCPCS

## 2025-01-09 PROCEDURE — 99214 OFFICE O/P EST MOD 30 MIN: CPT | Performed by: INTERNAL MEDICINE

## 2025-01-09 PROCEDURE — 80053 COMPREHEN METABOLIC PANEL: CPT | Performed by: STUDENT IN AN ORGANIZED HEALTH CARE EDUCATION/TRAINING PROGRAM

## 2025-01-09 RX ADMIN — THIAMINE HCL TAB 100 MG 50 MG: 100 TAB at 12:41

## 2025-01-09 RX ADMIN — LABETALOL HYDROCHLORIDE 200 MG: 200 TABLET, FILM COATED ORAL at 12:42

## 2025-01-09 RX ADMIN — ALPRAZOLAM 1 MG: 0.5 TABLET ORAL at 00:26

## 2025-01-09 RX ADMIN — ACETAMINOPHEN 650 MG: 325 TABLET ORAL at 00:26

## 2025-01-09 NOTE — PLAN OF CARE
Goal Outcome Evaluation:  Plan of Care Reviewed With: patient        Progress: no change  Outcome Evaluation: Pt is here for right sided abdominal pain. HIDA scan done, General surgery consulted. VSS, up ad lenny, on room air. Pt still complains fo constant pain, now radiating to lower side, pt also reports chest pain. EKG done, troponin sent. PRN pain meds given as needed.

## 2025-01-09 NOTE — PLAN OF CARE
Goal Outcome Evaluation:  Plan of Care Reviewed With: patient  Pt stable for discharge home. AVS given, instructed on how to take his new medications. Follow up with PCP and general surgery instructed.transport by family

## 2025-01-09 NOTE — CONSULTS
[facility]  Inpatient Progress Note    Reason for Followup:  Abdominal pain    Subjective     Interval History:   78yo presenting to ER c/o R sided abdominal pain.  Pain in both RUQ and RLQ.  Sometimes exacerbated by eating but not always.      HIDA scan with EF 10%.     CT A/P shows distention of gallbladder, confirmed on RUQ u/s.  No stones or sludge.     Colonoscopy 6/2024 with Dr Ramirez with IH/EH only    Pt reports improvement in sx this am.       Current Facility-Administered Medications:     acetaminophen (TYLENOL) tablet 650 mg, 650 mg, Oral, Q4H PRN, 650 mg at 01/09/25 0026 **OR** acetaminophen (TYLENOL) 160 MG/5ML oral solution 650 mg, 650 mg, Oral, Q4H PRN **OR** acetaminophen (TYLENOL) suppository 650 mg, 650 mg, Rectal, Q4H PRN, Dipesh Duran MD    ALPRAZolam (XANAX) tablet 1 mg, 1 mg, Oral, TID PRN, Dipesh Duran MD, 1 mg at 01/09/25 0026    hyoscyamine (LEVSIN) SL tablet 125 mcg, 125 mcg, Sublingual, Q4H PRN, Ariella Zepeda, APRN, 125 mcg at 01/08/25 1816    ipratropium-albuterol (DUO-NEB) nebulizer solution 3 mL, 3 mL, Nebulization, 4x Daily - RT, Ariella Zepeda, APRN, 3 mL at 01/08/25 2129    labetalol (NORMODYNE) tablet 200 mg, 200 mg, Oral, BID, [place] BridgeDipesh underwood MD, 200 mg at 01/08/25 2012    [DISCONTINUED] HYDROmorphone (DILAUDID) injection 1 mg, 1 mg, Intravenous, Q2H PRN, 1 mg at 01/08/25 1254 **AND** naloxone (NARCAN) injection 0.4 mg, 0.4 mg, Intravenous, Q5 Min PRN, Ermelinda Griffin PA-C    ondansetron ODT (ZOFRAN-ODT) disintegrating tablet 4 mg, 4 mg, Oral, Q6H PRN **OR** ondansetron (ZOFRAN) injection 4 mg, 4 mg, Intravenous, Q6H PRN, RogerDipesh underwood MD, 4 mg at 01/08/25 1436    oxyCODONE-acetaminophen (PERCOCET) 5-325 MG per tablet 1 tablet, 1 tablet, Oral, Q8H PRN, RogerDipesh underwood MD, 1 tablet at 01/08/25 1750    sodium chloride 0.9 % flush 10 mL, 10 mL, Intravenous, PRN, Lee Stanley MD    sodium chloride 0.9 % flush 10 mL, 10 mL, Intravenous,  Q12H, Dipesh Duran MD, 10 mL at 01/08/25 2012    sodium chloride 0.9 % flush 10 mL, 10 mL, Intravenous, PRN, Dipesh Duran MD    sodium chloride 0.9 % infusion 40 mL, 40 mL, Intravenous, PRN, Dipesh Duran MD    thiamine (VITAMIN B-1) tablet 50 mg, 50 mg, Oral, Daily, Dipesh Duran MD, 50 mg at 01/08/25 1255    Objective     Vital Signs  Temp:  [97.9 °F (36.6 °C)-98.6 °F (37 °C)] 97.9 °F (36.6 °C)  Heart Rate:  [64-84] 82  Resp:  [18] 18  BP: (128-170)/(68-98) 140/70  Body mass index is 17.97 kg/m².  No intake or output data in the 24 hours ending 01/09/25 0807  No intake/output data recorded.     Physical Exam:   General: patient awake, alert and cooperative   Abdomen: soft, nontender, nondistended; normal bowel sounds     Results Review:     I reviewed the patient's new clinical results.  I reviewed the patient's new imaging results and agree with the interpretation.    Results from last 7 days   Lab Units 01/09/25 0423 01/08/25  0152 01/07/25  1854   WBC 10*3/mm3 5.35 5.70 5.88   HEMOGLOBIN g/dL 11.4* 11.4* 11.8*   HEMATOCRIT % 34.1* 35.8* 34.8*   PLATELETS 10*3/mm3 238 230 270     Results from last 7 days   Lab Units 01/09/25  0423 01/08/25  0152 01/07/25  1854   SODIUM mmol/L 138 136 136   POTASSIUM mmol/L 5.1 5.0 5.0   CHLORIDE mmol/L 102 104 104   CO2 mmol/L 24.0 21.6* 21.9*   BUN mg/dL 25* 17 16   CREATININE mg/dL 1.08 1.09 1.20   CALCIUM mg/dL 9.2 9.4 9.6   BILIRUBIN mg/dL 0.5 0.5 0.5   ALK PHOS U/L 66 76 80   ALT (SGPT) U/L 16 17 18   AST (SGOT) U/L 17 22 24   GLUCOSE mg/dL 87 116* 97         Lab Results   Lab Value Date/Time    LIPASE 25 01/07/2025 1854       Radiology:  [unfilled]      Assessment & Plan   Assessment:    Abdominal pain   Abnormal HIDA scan    Plan:   Pt with recent normal colonoscopy, no e/o colitis or other inflammatory process of GI tract  He does have some post-prandial element of pain and has very abnormal HIDA scan, but general surgery does not feel this is culprit.    He reports improvement in sx this am, I don't have any plans for endoscopic evaluation and he can be discharged at anytime from GI standpoint.    I discussed the patient's findings and my recommendations with patient.          Wolf Crawford M.D.  [facility]  [address] [place] [address]  Office: [phone]

## 2025-01-09 NOTE — CASE MANAGEMENT/SOCIAL WORK
Discharge Planning Assessment  [facility]     Patient Name: Chaka Paul  MRN: [id number]  Today's Date: 1/9/2025    Admit Date: 1/7/2025    Plan: Home   Discharge Needs Assessment       Row Name 01/09/25 0945       Living Environment    People in Home alone    Current Living Arrangements home    Potentially Unsafe Housing Conditions none    Primary Care Provided by self    Provides Primary Care For no one    Family Caregiver if Needed child(abdias), adult    Quality of Family Relationships involved;helpful;supportive    Able to Return to Prior Arrangements yes       Resource/Environmental Concerns    Resource/Environmental Concerns none    Transportation Concerns none       Transition Planning    Patient/Family Anticipates Transition to home    Patient/Family Anticipated Services at Transition none    Transportation Anticipated family or friend will provide       Discharge Needs Assessment    Readmission Within the Last 30 Days no previous admission in last 30 days    Equipment Currently Used at Home none    Concerns to be Addressed denies needs/concerns at this time;no discharge needs identified    Anticipated Changes Related to Illness none    Equipment Needed After Discharge none                   Discharge Plan       Row Name 01/09/25 0946       Plan    Plan Home    Plan Comments CCP met with patient at bedside. CCP role explained and discharge planning discussed. Face sheet verified and RIBEIRO noted. Patient's PCP is Dr. Madison. Patient lives alone, with three steps to the entrance of his home. Patient's bedroom and bathroom are on the main level. Patient is independent with his ADLs and does not use DME. Patient has not used home health or been to SNF. Patient drives himself to his medical appointments. Patient plans to return home at d/c and does not anticipate any discharge needs. Patient's son in law to provide transportation at d/c. CCP will follow for any needs to arise. [place] PAULINO                   Continued Care and Services - Admitted Since 1/7/2025    No active coordination exists for this encounter.       Expected Discharge Date and Time       Expected Discharge Date Expected Discharge Time    Jan 9, 2025            Demographic Summary       Row Name 01/09/25 0945       General Information    Admission Type observation    Arrived From emergency department    Required Notices Provided Observation Status Notice    Referral Source admission list    Reason for Consult discharge planning    Preferred Language English                   Functional Status       Row Name 01/09/25 0945       Functional Status    Usual Activity Tolerance good    Current Activity Tolerance good       Functional Status, IADL    Medications independent    Meal Preparation independent    Housekeeping independent    Laundry independent    Shopping independent       Mental Status    General Appearance WDL WDL       Mental Status Summary    Recent Changes in Mental Status/Cognitive Functioning no changes                   Psychosocial    No documentation.                  Abuse/Neglect    No documentation.                  Legal    No documentation.                  Substance Abuse    No documentation.                  Patient Forms    No documentation.                     JEFFERSON Rivera

## 2025-01-09 NOTE — PROGRESS NOTES
"Pt admitted to the observation unit from the emergency department with complaints of abdominal pain, nausea.  Today pain is resolved.     On exam,   General: No acute distress, nontoxic  HEENT: EOMI  Pulm: Symmetric chest rise, nonlabored breathing  CV: Regular rate and rhythm  GI: Nondistended, nontender  MSK: No deformity  Skin: Warm, dry  Neuro: Awake, alert, oriented x 4, moving all extremities, no focal deficits  Psych: Calm, cooperative    Vital signs and nursing notes reviewed.           Plan: Given imaging findings labs and exam, I suspect probably does have some biliary dyskinesia though no signs of cholecystitis.  Can follow-up outpatient with surgery for this.  They have cleared him and signed off currently in the hospital.  GI has been consulted for ongoing lower abdominal pains that have since resolved today.  He says that he gets a \"knot\" when he has a bowel movement I suspect he probably has an inguinal hernia, on exam no evidence of that currently.  Likely discharge today with continued outpatient surgical follow-up.         MD Attestation Note    SHARED VISIT: This visit was performed by BOTH a physician and an APC. The substantive portion of the medical decision making was performed by this attesting physician who made or approved the management plan and takes responsibility for patient management. All studies in the APC note (if performed) were independently interpreted by me.                "

## 2025-01-09 NOTE — DISCHARGE SUMMARY
"ED OBSERVATION PROGRESS/DISCHARGE SUMMARY    Date of Admission: 1/7/2025   LOS: 0 days   PCP: Frank Madison MD    Subjective patient reports improvement in his symptoms today.  Patient tells me approximately 3 weeks ago, he experienced a \"knot\" in his abdomen while trying to have a bowel movement.  Patient tolerating p.o. this morning and is in agreement with plan for outpatient follow-up with general surgery.    Hospital Outcome:     Patient is an afebrile 79-year-old  gentleman admitted to the ED observation for right upper quadrant abdominal discomfort.  Imaging in the ED shows distended gallbladder without evidence of stones or sludge.  This a.m. he has no transaminitis and his bilirubin is 0.5.  He had 2 negative high-sensitivity troponins and his chest x-ray showed no acute findings.  He endorsed some shortness of breath, advised similar to when EMS given a breathing treatment yesterday, will schedule some DuoNebs.  He is a former smoker, quit about 13 years ago.     This morning he endorses persistent right upper quadrant abdominal discomfort.  He has been n.p.o. since midnight, scheduled for HIDA scan this a.m.  Awaiting general surgery consultation.     1/8/25:  Patient underwent HIDA scan which showed no common duct or cystic obstruction.  Ejection fraction approximately 10% which could be associated with biliary dyskinesia or chronic cholecystitis.  I discussed findings with Dr. Pitts who recommends against any surgical intervention as patient complaining of right lower quadrant pain and right upper quadrant pain intermittently over the last 6 months.  He is afebrile.  His labs are reassuring.  He is no transaminitis or elevation of his serum bilirubin noticed he have any bilirubin spilling in his urine.     Patient reporting chest discomfort after returning from HIDA scan.  He had 2 negative high-sensitivity troponins yesterday.  His repeat EKG this afternoon showed sinus rhythm rate of 81 " with QTc 462 and no evidence of acute ischemia.  His repeat troponin is is normal at 21.  Low suspicion for cardiac etiology.     Patient states his pain significantly worsened after eating and he has been having significant nausea and had episode of vomiting earlier.  He reports that the pain has kind of moved into his right lower abdomen and feels like a severe intermittent cramping sensation followed by soreness.  Patient and daughter at bedside are very uneasy about patient being discharged with continued symptoms.  Patient had a colonoscopy done in June 2024 that showed external and internal hemorrhoids as well as a tortuous colon otherwise normal.    1/9/2025:   Patient was seen by GI this morning, Dr. Crawford, no plans for endoscopic evaluation and cleared for discharge from GI standpoint.    I had lengthy discussion with patient about his symptoms and plans for outpatient follow-up with general surgery.  He is in agreement.  However, patient did mention that his wife passed away in August and he has been experiencing increasing abdominal discomfort since that time.  Advised patient that his grief may be contributing to his symptoms.  Patient reports good family support and has a cat at home.    Usual return to ER precautions advised, patient expresses understanding and is in agreement with plan.    ROS:  General: no fevers, chills  Respiratory: no cough, dyspnea  Cardiovascular: no chest pain, palpitations  Abdomen: No abdominal pain, nausea, vomiting, or diarrhea  Neurologic: No focal weakness    Objective   Physical Exam:  I have reviewed the vital signs.  Temp:  [97.9 °F (36.6 °C)-98.6 °F (37 °C)] 97.9 °F (36.6 °C)  Heart Rate:  [64-84] 82  Resp:  [18] 18  BP: (128-170)/(68-98) 140/70  General Appearance:    Alert, cooperative, no distress  Head:    Normocephalic, atraumatic  Eyes:    Sclerae anicteric  Neck:   Supple, no mass  Lungs: Clear to auscultation bilaterally, respirations unlabored  Heart:  Regular rate and rhythm, S1 and S2 normal, no murmur, rub or gallop  Abdomen:  Soft, nontender, bowel sounds active, nondistended  Extremities: No clubbing, cyanosis, or edema to lower extremities  Pulses:  2+ and symmetric in distal lower extremities  Skin: No rashes   Neurologic: Oriented x3, Normal strength to extremities    Results Review:    I have reviewed the labs, radiology results and diagnostic studies.    Results from last 7 days   Lab Units 01/09/25  0423   WBC 10*3/mm3 5.35   HEMOGLOBIN g/dL 11.4*   HEMATOCRIT % 34.1*   PLATELETS 10*3/mm3 238     Results from last 7 days   Lab Units 01/09/25  0423 01/08/25  0152 01/07/25  1854   SODIUM mmol/L 138 136 136   POTASSIUM mmol/L 5.1 5.0 5.0   CHLORIDE mmol/L 102 104 104   CO2 mmol/L 24.0 21.6* 21.9*   BUN mg/dL 25* 17 16   CREATININE mg/dL 1.08 1.09 1.20   CALCIUM mg/dL 9.2 9.4 9.6   BILIRUBIN mg/dL 0.5 0.5 0.5   ALK PHOS U/L 66 76 80   ALT (SGPT) U/L 16 17 18   AST (SGOT) U/L 17 22 24   GLUCOSE mg/dL 87 116* 97     Imaging Results (Last 24 Hours)       Procedure Component Value Units Date/Time    NM HIDA SCAN WITH PHARMACOLOGICAL INTERVENTION [[id number]] Collected: 01/08/25 1506     Updated: 01/08/25 1523    Narrative:      NUCLEAR MEDICINE HIDA SCAN WITH PHARMACOLOGIC INTERVENTION     HISTORY: Right upper quadrant pain     TECHNIQUE: 60 minute anterior dynamic imaging over the abdomen was  performed after injection of 12.1 mCi Tc-choletec IV. 1.2 mcg Kinevac  was administered IV to determine gallbladder ejection fraction.     FINDINGS: There is normal hepatic uptake and excretion with appropriate  clearance of background blood pool activity. There is normal  visualization of biliary tract, gallbladder, small bowel. After CCK  infusion, gallbladder ejection fraction is calculated to be 10%  (normal  range is considered 35% or greater)       Impression:      1. No common duct or cystic duct obstruction.  2. Gallbladder ejection fraction measures 10% which is  abnormally low  and this may be associated with biliary dyskinesia or chronic  cholecystitis..     This report was finalized on 1/8/2025 3:17 PM by Kj Buenrostro M.D on  Workstation: [id number]               I have reviewed the medications.  ---------------------------------------------------------------------------------------------  Assessment & Plan   Assessment/Problem List    RUQ pain      Plan:    Right upper quadrant abdominal pain  -CT abdomen and gallbladder ultrasound notes gallbladder distention without stones or sludge identified  -Normal LFTs, T. bili 0.5  -HIDA scan shows no common duct or cystic duct obstruction, ejection fraction 10%  -General Surgery consulted, no surgical intervention recommended acutely, clear for outpatient follow-up  -Analgesics and antiemetics as needed  -EKG nonischemic, troponin negative x 3.  19, 20, 21.  -Consult GI, cleared for discharge from GI standpoint  -Patient tolerating p.o. on day of discharge  -Follow-up with general surgery  -Zofran at discharge     Hypertension  -Continue labetalol  -Monitor vital signs every 4 hours     Wheezing  -Uncertain of history of COPD, former smoker  -Ventolin inhaler discharge    Disposition: Home    Follow-up after Discharge: PCP, general surgery    36 minutes has been spent by [facility] providers in the care of this patient while under observation status     This note will serve as discharge summary    LESLY Sweeney 01/09/25 12:01 EST    I have worn appropriate PPE during this patient encounter and sanitized my hands with entering and exiting patient room.

## 2025-01-09 NOTE — DISCHARGE INSTRUCTIONS
Follow up with general surgery, call their office to schedule. Zofran as needed for nausea. Return to the emergency department with worsening symptoms, uncontrolled pain, inability to tolerate oral liquids, fever greater than 101°F not controlled by Tylenol or as needed with emergent concerns.

## 2025-01-10 NOTE — CASE MANAGEMENT/SOCIAL WORK
Case Management Discharge Note      Final Note: Home; self-care. [place] PAULINO         Selected Continued Care - Discharged on 1/9/2025 Admission date: 1/7/2025 - Discharge disposition: Home or Self Care      Destination    No services have been selected for the patient.                Durable Medical Equipment    No services have been selected for the patient.                Dialysis/Infusion    No services have been selected for the patient.                Home Medical Care    No services have been selected for the patient.                Therapy    No services have been selected for the patient.                Community Resources    No services have been selected for the patient.                Community & DME    No services have been selected for the patient.                         Final Discharge Disposition Code: 01 - home or self-care

## 2025-01-27 ENCOUNTER — PREP FOR SURGERY (OUTPATIENT)
Dept: OTHER | Facility: HOSPITAL | Age: 80
End: 2025-01-27
Payer: MEDICARE

## 2025-01-27 ENCOUNTER — OFFICE VISIT (OUTPATIENT)
Dept: SURGERY | Facility: CLINIC | Age: 80
End: 2025-01-27
Payer: MEDICARE

## 2025-01-27 VITALS
OXYGEN SATURATION: 99 % | HEART RATE: 62 BPM | SYSTOLIC BLOOD PRESSURE: 148 MMHG | DIASTOLIC BLOOD PRESSURE: 84 MMHG | BODY MASS INDEX: 18.54 KG/M2 | WEIGHT: 132.4 LBS | HEIGHT: 71 IN

## 2025-01-27 DIAGNOSIS — K40.90 NON-RECURRENT UNILATERAL INGUINAL HERNIA WITHOUT OBSTRUCTION OR GANGRENE: Primary | ICD-10-CM

## 2025-01-27 DIAGNOSIS — K40.90 RIGHT INGUINAL HERNIA: Primary | ICD-10-CM

## 2025-01-27 DIAGNOSIS — K82.8 BILIARY DYSKINESIA: ICD-10-CM

## 2025-01-27 PROCEDURE — 99214 OFFICE O/P EST MOD 30 MIN: CPT | Performed by: STUDENT IN AN ORGANIZED HEALTH CARE EDUCATION/TRAINING PROGRAM

## 2025-01-27 PROCEDURE — 1159F MED LIST DOCD IN RCRD: CPT | Performed by: STUDENT IN AN ORGANIZED HEALTH CARE EDUCATION/TRAINING PROGRAM

## 2025-01-27 PROCEDURE — 1160F RVW MEDS BY RX/DR IN RCRD: CPT | Performed by: STUDENT IN AN ORGANIZED HEALTH CARE EDUCATION/TRAINING PROGRAM

## 2025-01-27 RX ORDER — OMEPRAZOLE 40 MG/1
40 CAPSULE, DELAYED RELEASE ORAL DAILY
COMMUNITY
Start: 2024-09-04

## 2025-01-27 NOTE — PROGRESS NOTES
General Surgery History and Physical      Summary:    Chaka Paul is a 79 y.o. gentleman with a reducible right inguinal hernia.  Discussed options including observation versus robotic right inguinal hernia repair with mesh. Due to his symptoms, he would like to proceed with surgery. The risks (including bleeding, infection, damage to surrounding structures, need to open due to prior surgery), benefits and alternatives were explained to the patient who agreed and wished to proceed.     Referring Provider: No ref. provider found    Chief Complaint:    Abnormal HIDA scan, right groin pain/bulge    History of Present Illness:    Chaka Paul is a 79 y.o. gentleman who presents today for follow up. Since discharge, his symptoms have resolved but he has noticed a moderate bulge in the right groin. He is having discomfort at this area.     Past Medical History:   History of prostate cancer  HLD  HTN    Past Surgical History:    Past Surgical History:   Procedure Laterality Date    COLONOSCOPY  2004    COLONOSCOPY N/A 6/27/2024    Procedure: COLONOSCOPY to cecum and t.i.;  Surgeon: Arnol Ramirez MD;  Location: [facility] ENDOSCOPY;  Service: Gastroenterology;  Laterality: N/A;  pre- rectal bleeding and hx polyps  post- hemorrhoids    PROSTATE SURGERY      TONSILLECTOMY       Family History:    + history of colon cancer: father    Social History:    Denies tobacco use  Denies alcohol use    Allergies:   No Known Allergies    Medications:     Current Outpatient Medications:     albuterol sulfate  (90 Base) MCG/ACT inhaler, Inhale 2 puffs Every 4 (Four) Hours As Needed for Wheezing., Disp: 18 g, Rfl: 3    ALPRAZolam (XANAX) 1 MG tablet, Take 1 tablet by mouth 3 (Three) Times a Day As Needed for Anxiety. for anxiety, Disp: 60 tablet, Rfl: 0    labetalol (NORMODYNE) 200 MG tablet, Take  by mouth 2 (Two) Times a Day., Disp: , Rfl:     meloxicam (MOBIC) 15 MG tablet, Take 1 tablet by mouth Daily., Disp: 90  tablet, Rfl: 1    omeprazole (priLOSEC) 40 MG capsule, Take 1 capsule by mouth Daily., Disp: , Rfl:     ondansetron ODT (ZOFRAN-ODT) 4 MG disintegrating tablet, Place 1 tablet on the tongue Every 8 (Eight) Hours As Needed for Nausea or Vomiting., Disp: 30 tablet, Rfl: 0    simvastatin (ZOCOR) 20 MG tablet, TAKE 1 TABLET BY MOUTH EVERY NIGHT, Disp: 90 tablet, Rfl: 1    Thiamine HCl (vitamin B-1) 50 MG tablet, Take 1 tablet by mouth Daily., Disp: , Rfl:     triamcinolone (KENALOG) 0.1 % cream, APPLY TOPICALLY TO THE AFFECTED AREA ON RASH ON ARMS/LEGS TWICE DAILY AS NEEDED, Disp: 30 g, Rfl: 1    Radiology/Endoscopy:    CT abdomen pelvis: gallbladder distention   RUQ US reviewed: gallbladder distention   HIDA scan: EF 10%    Labs:    Labs from 1/9/2025 reviewed by me     Review of Systems:   Influenza-like illness: no fever, no  cough, no  sore throat, no  body aches, no loss of sense of taste or smell, no known exposure to person with Covid-19.  Constitutional: Negative for fevers or chills  HENT: Negative for hearing loss or runny nose  Eyes: Negative for vision changes or scleral icterus  Respiratory: Negative for cough or shortness of breath  Cardiovascular: Negative for chest pain or heart palpitations  Gastrointestinal: + for right groin pain; Denies for abdominal pain, nausea, vomiting, constipation, melena, or hematochezia  Genitourinary: Negative for hematuria or dysuria  Musculoskeletal: Negative for joint swelling or gait instability  Neurologic: Negative for tremors or seizures  Psychiatric: Negative for suicidal ideations or depression  All other systems reviewed and negative    Physical Exam:   Constitutional: Well-developed well-nourished, no acute distress  Eyes: Conjunctiva normal, sclera nonicteric  ENMT: Hearing grossly normal, oral mucosa moist  Neck: Supple, trachea midline  Respiratory: Clear to auscultation, normal inspiratory effort  Cardiovascular: Regular rate, no peripheral edema, no jugular  venous distention  Gastrointestinal: Soft, nontender, reducible right inguinal hernia  Skin:  Warm, dry, no rash on visualized skin surfaces  Musculoskeletal: Symmetric strength, normal gait  Psychiatric: Alert and oriented ×3, normal affect     Lupe Pitts M.D.  General and Endoscopic Surgery  [facility]    [address], [address]  [place], [place], [address]  P: [phone]  F: [phone]

## 2025-01-27 NOTE — H&P (VIEW-ONLY)
General Surgery History and Physical      Summary:    Chaka Paul is a 79 y.o. gentleman with a reducible right inguinal hernia.  Discussed options including observation versus robotic right inguinal hernia repair with mesh. Due to his symptoms, he would like to proceed with surgery. The risks (including bleeding, infection, damage to surrounding structures, need to open due to prior surgery), benefits and alternatives were explained to the patient who agreed and wished to proceed.     Referring Provider: No ref. provider found    Chief Complaint:    Abnormal HIDA scan, right groin pain/bulge    History of Present Illness:    Chaka Paul is a 79 y.o. gentleman who presents today for follow up. Since discharge, his symptoms have resolved but he has noticed a moderate bulge in the right groin. He is having discomfort at this area.     Past Medical History:   History of prostate cancer  HLD  HTN    Past Surgical History:    Past Surgical History:   Procedure Laterality Date    COLONOSCOPY  2004    COLONOSCOPY N/A 6/27/2024    Procedure: COLONOSCOPY to cecum and t.i.;  Surgeon: Arnol Ramirez MD;  Location: [facility] ENDOSCOPY;  Service: Gastroenterology;  Laterality: N/A;  pre- rectal bleeding and hx polyps  post- hemorrhoids    PROSTATE SURGERY      TONSILLECTOMY       Family History:    + history of colon cancer: father    Social History:    Denies tobacco use  Denies alcohol use    Allergies:   No Known Allergies    Medications:     Current Outpatient Medications:     albuterol sulfate  (90 Base) MCG/ACT inhaler, Inhale 2 puffs Every 4 (Four) Hours As Needed for Wheezing., Disp: 18 g, Rfl: 3    ALPRAZolam (XANAX) 1 MG tablet, Take 1 tablet by mouth 3 (Three) Times a Day As Needed for Anxiety. for anxiety, Disp: 60 tablet, Rfl: 0    labetalol (NORMODYNE) 200 MG tablet, Take  by mouth 2 (Two) Times a Day., Disp: , Rfl:     meloxicam (MOBIC) 15 MG tablet, Take 1 tablet by mouth Daily., Disp: 90  tablet, Rfl: 1    omeprazole (priLOSEC) 40 MG capsule, Take 1 capsule by mouth Daily., Disp: , Rfl:     ondansetron ODT (ZOFRAN-ODT) 4 MG disintegrating tablet, Place 1 tablet on the tongue Every 8 (Eight) Hours As Needed for Nausea or Vomiting., Disp: 30 tablet, Rfl: 0    simvastatin (ZOCOR) 20 MG tablet, TAKE 1 TABLET BY MOUTH EVERY NIGHT, Disp: 90 tablet, Rfl: 1    Thiamine HCl (vitamin B-1) 50 MG tablet, Take 1 tablet by mouth Daily., Disp: , Rfl:     triamcinolone (KENALOG) 0.1 % cream, APPLY TOPICALLY TO THE AFFECTED AREA ON RASH ON ARMS/LEGS TWICE DAILY AS NEEDED, Disp: 30 g, Rfl: 1    Radiology/Endoscopy:    CT abdomen pelvis: gallbladder distention   RUQ US reviewed: gallbladder distention   HIDA scan: EF 10%    Labs:    Labs from 1/9/2025 reviewed by me     Review of Systems:   Influenza-like illness: no fever, no  cough, no  sore throat, no  body aches, no loss of sense of taste or smell, no known exposure to person with Covid-19.  Constitutional: Negative for fevers or chills  HENT: Negative for hearing loss or runny nose  Eyes: Negative for vision changes or scleral icterus  Respiratory: Negative for cough or shortness of breath  Cardiovascular: Negative for chest pain or heart palpitations  Gastrointestinal: + for right groin pain; Denies for abdominal pain, nausea, vomiting, constipation, melena, or hematochezia  Genitourinary: Negative for hematuria or dysuria  Musculoskeletal: Negative for joint swelling or gait instability  Neurologic: Negative for tremors or seizures  Psychiatric: Negative for suicidal ideations or depression  All other systems reviewed and negative    Physical Exam:   Constitutional: Well-developed well-nourished, no acute distress  Eyes: Conjunctiva normal, sclera nonicteric  ENMT: Hearing grossly normal, oral mucosa moist  Neck: Supple, trachea midline  Respiratory: Clear to auscultation, normal inspiratory effort  Cardiovascular: Regular rate, no peripheral edema, no jugular  venous distention  Gastrointestinal: Soft, nontender, reducible right inguinal hernia  Skin:  Warm, dry, no rash on visualized skin surfaces  Musculoskeletal: Symmetric strength, normal gait  Psychiatric: Alert and oriented ×3, normal affect     Lupe Pitts M.D.  General and Endoscopic Surgery  [facility]    [address], [address]  [place], [place], [address]  P: [phone]  F: [phone]

## 2025-02-14 ENCOUNTER — HOSPITAL ENCOUNTER (OUTPATIENT)
Facility: HOSPITAL | Age: 80
Setting detail: HOSPITAL OUTPATIENT SURGERY
Discharge: HOME | End: 2025-02-14
Attending: STUDENT IN AN ORGANIZED HEALTH CARE EDUCATION/TRAINING PROGRAM | Admitting: STUDENT IN AN ORGANIZED HEALTH CARE EDUCATION/TRAINING PROGRAM
Payer: MEDICARE

## 2025-02-14 ENCOUNTER — ANESTHESIA EVENT (OUTPATIENT)
Dept: PERIOP | Facility: HOSPITAL | Age: 80
End: 2025-02-14
Payer: MEDICARE

## 2025-02-14 ENCOUNTER — ANESTHESIA (OUTPATIENT)
Dept: PERIOP | Facility: HOSPITAL | Age: 80
End: 2025-02-14
Payer: MEDICARE

## 2025-02-14 VITALS
RESPIRATION RATE: 14 BRPM | SYSTOLIC BLOOD PRESSURE: 158 MMHG | HEART RATE: 65 BPM | OXYGEN SATURATION: 96 % | DIASTOLIC BLOOD PRESSURE: 85 MMHG | TEMPERATURE: 98.2 F

## 2025-02-14 DIAGNOSIS — K40.90 RIGHT INGUINAL HERNIA: Primary | ICD-10-CM

## 2025-02-14 PROCEDURE — C1781 MESH (IMPLANTABLE): HCPCS | Performed by: STUDENT IN AN ORGANIZED HEALTH CARE EDUCATION/TRAINING PROGRAM

## 2025-02-14 PROCEDURE — S2900 ROBOTIC SURGICAL SYSTEM: HCPCS | Performed by: STUDENT IN AN ORGANIZED HEALTH CARE EDUCATION/TRAINING PROGRAM

## 2025-02-14 PROCEDURE — 25010000002 HYDROMORPHONE 1 MG/ML SOLUTION: Performed by: NURSE ANESTHETIST, CERTIFIED REGISTERED

## 2025-02-14 PROCEDURE — 25010000002 LIDOCAINE PF 2% 2 % SOLUTION: Performed by: NURSE ANESTHETIST, CERTIFIED REGISTERED

## 2025-02-14 PROCEDURE — 25010000002 PROPOFOL 10 MG/ML EMULSION: Performed by: NURSE ANESTHETIST, CERTIFIED REGISTERED

## 2025-02-14 PROCEDURE — 49650 LAP ING HERNIA REPAIR INIT: CPT | Performed by: REGISTERED NURSE

## 2025-02-14 PROCEDURE — 25010000002 SUGAMMADEX 200 MG/2ML SOLUTION: Performed by: NURSE ANESTHETIST, CERTIFIED REGISTERED

## 2025-02-14 PROCEDURE — 49650 LAP ING HERNIA REPAIR INIT: CPT | Performed by: STUDENT IN AN ORGANIZED HEALTH CARE EDUCATION/TRAINING PROGRAM

## 2025-02-14 PROCEDURE — 25810000003 LACTATED RINGERS PER 1000 ML: Performed by: STUDENT IN AN ORGANIZED HEALTH CARE EDUCATION/TRAINING PROGRAM

## 2025-02-14 PROCEDURE — 25010000002 HYDROMORPHONE PER 4 MG: Performed by: NURSE ANESTHETIST, CERTIFIED REGISTERED

## 2025-02-14 PROCEDURE — 25810000003 SODIUM CHLORIDE PER 500 ML: Performed by: STUDENT IN AN ORGANIZED HEALTH CARE EDUCATION/TRAINING PROGRAM

## 2025-02-14 PROCEDURE — 25010000002 CEFAZOLIN PER 500 MG: Performed by: STUDENT IN AN ORGANIZED HEALTH CARE EDUCATION/TRAINING PROGRAM

## 2025-02-14 PROCEDURE — 25010000002 ONDANSETRON PER 1 MG: Performed by: NURSE ANESTHETIST, CERTIFIED REGISTERED

## 2025-02-14 PROCEDURE — 25010000002 FENTANYL CITRATE (PF) 100 MCG/2ML SOLUTION: Performed by: NURSE ANESTHETIST, CERTIFIED REGISTERED

## 2025-02-14 PROCEDURE — 25010000002 DEXAMETHASONE SODIUM PHOSPHATE 20 MG/5ML SOLUTION: Performed by: NURSE ANESTHETIST, CERTIFIED REGISTERED

## 2025-02-14 DEVICE — DEV WND/CLS CONTRL TISS STRATAFIX SPIRAL MNCRYL SH 2/0 20CM: Type: IMPLANTABLE DEVICE | Site: INGUINAL | Status: FUNCTIONAL

## 2025-02-14 DEVICE — 3DMAX MID ANATOMICAL MESH, 10 CM X 16 CM (4" X 6"), LARGE, RIGHT
Type: IMPLANTABLE DEVICE | Site: INGUINAL | Status: FUNCTIONAL
Brand: 3DMAX

## 2025-02-14 RX ORDER — ONDANSETRON 2 MG/ML
INJECTION INTRAMUSCULAR; INTRAVENOUS AS NEEDED
Status: DISCONTINUED | OUTPATIENT
Start: 2025-02-14 | End: 2025-02-14 | Stop reason: SURG

## 2025-02-14 RX ORDER — LIDOCAINE HYDROCHLORIDE 10 MG/ML
0.5 INJECTION, SOLUTION INFILTRATION; PERINEURAL ONCE AS NEEDED
Status: DISCONTINUED | OUTPATIENT
Start: 2025-02-14 | End: 2025-02-14 | Stop reason: HOSPADM

## 2025-02-14 RX ORDER — PROPOFOL 10 MG/ML
VIAL (ML) INTRAVENOUS AS NEEDED
Status: DISCONTINUED | OUTPATIENT
Start: 2025-02-14 | End: 2025-02-14 | Stop reason: SURG

## 2025-02-14 RX ORDER — MIDAZOLAM HYDROCHLORIDE 1 MG/ML
0.5 INJECTION, SOLUTION INTRAMUSCULAR; INTRAVENOUS
Status: DISCONTINUED | OUTPATIENT
Start: 2025-02-14 | End: 2025-02-14 | Stop reason: HOSPADM

## 2025-02-14 RX ORDER — NALOXONE HCL 0.4 MG/ML
0.2 VIAL (ML) INJECTION AS NEEDED
Status: DISCONTINUED | OUTPATIENT
Start: 2025-02-14 | End: 2025-02-14 | Stop reason: HOSPADM

## 2025-02-14 RX ORDER — HYDROCODONE BITARTRATE AND ACETAMINOPHEN 5; 325 MG/1; MG/1
1 TABLET ORAL EVERY 6 HOURS PRN
Qty: 12 TABLET | Refills: 0 | Status: SHIPPED | OUTPATIENT
Start: 2025-02-14

## 2025-02-14 RX ORDER — HYDROCODONE BITARTRATE AND ACETAMINOPHEN 5; 325 MG/1; MG/1
1 TABLET ORAL ONCE AS NEEDED
Status: COMPLETED | OUTPATIENT
Start: 2025-02-14 | End: 2025-02-14

## 2025-02-14 RX ORDER — ATROPINE SULFATE 0.4 MG/ML
0.4 INJECTION, SOLUTION INTRAMUSCULAR; INTRAVENOUS; SUBCUTANEOUS ONCE AS NEEDED
Status: DISCONTINUED | OUTPATIENT
Start: 2025-02-14 | End: 2025-02-14 | Stop reason: HOSPADM

## 2025-02-14 RX ORDER — OXYCODONE AND ACETAMINOPHEN 7.5; 325 MG/1; MG/1
1 TABLET ORAL EVERY 4 HOURS PRN
Status: DISCONTINUED | OUTPATIENT
Start: 2025-02-14 | End: 2025-02-14 | Stop reason: HOSPADM

## 2025-02-14 RX ORDER — IPRATROPIUM BROMIDE AND ALBUTEROL SULFATE 2.5; .5 MG/3ML; MG/3ML
3 SOLUTION RESPIRATORY (INHALATION) ONCE AS NEEDED
Status: DISCONTINUED | OUTPATIENT
Start: 2025-02-14 | End: 2025-02-14 | Stop reason: HOSPADM

## 2025-02-14 RX ORDER — SODIUM CHLORIDE 9 MG/ML
INJECTION, SOLUTION INTRAVENOUS AS NEEDED
Status: DISCONTINUED | OUTPATIENT
Start: 2025-02-14 | End: 2025-02-14 | Stop reason: HOSPADM

## 2025-02-14 RX ORDER — BUPIVACAINE HYDROCHLORIDE AND EPINEPHRINE 5; 5 MG/ML; UG/ML
INJECTION, SOLUTION EPIDURAL; INTRACAUDAL; PERINEURAL AS NEEDED
Status: DISCONTINUED | OUTPATIENT
Start: 2025-02-14 | End: 2025-02-14 | Stop reason: HOSPADM

## 2025-02-14 RX ORDER — ONDANSETRON 2 MG/ML
4 INJECTION INTRAMUSCULAR; INTRAVENOUS ONCE AS NEEDED
Status: DISCONTINUED | OUTPATIENT
Start: 2025-02-14 | End: 2025-02-14 | Stop reason: HOSPADM

## 2025-02-14 RX ORDER — DIPHENHYDRAMINE HYDROCHLORIDE 50 MG/ML
12.5 INJECTION INTRAMUSCULAR; INTRAVENOUS
Status: DISCONTINUED | OUTPATIENT
Start: 2025-02-14 | End: 2025-02-14 | Stop reason: HOSPADM

## 2025-02-14 RX ORDER — SODIUM CHLORIDE 0.9 % (FLUSH) 0.9 %
3 SYRINGE (ML) INJECTION EVERY 12 HOURS SCHEDULED
Status: DISCONTINUED | OUTPATIENT
Start: 2025-02-14 | End: 2025-02-14 | Stop reason: HOSPADM

## 2025-02-14 RX ORDER — FENTANYL CITRATE 50 UG/ML
INJECTION, SOLUTION INTRAMUSCULAR; INTRAVENOUS AS NEEDED
Status: DISCONTINUED | OUTPATIENT
Start: 2025-02-14 | End: 2025-02-14 | Stop reason: SURG

## 2025-02-14 RX ORDER — SODIUM CHLORIDE, SODIUM LACTATE, POTASSIUM CHLORIDE, CALCIUM CHLORIDE 600; 310; 30; 20 MG/100ML; MG/100ML; MG/100ML; MG/100ML
9 INJECTION, SOLUTION INTRAVENOUS CONTINUOUS
Status: DISCONTINUED | OUTPATIENT
Start: 2025-02-14 | End: 2025-02-14 | Stop reason: HOSPADM

## 2025-02-14 RX ORDER — PHENYLEPHRINE HCL IN 0.9% NACL 1 MG/10 ML
SYRINGE (ML) INTRAVENOUS AS NEEDED
Status: DISCONTINUED | OUTPATIENT
Start: 2025-02-14 | End: 2025-02-14 | Stop reason: SURG

## 2025-02-14 RX ORDER — DEXAMETHASONE SODIUM PHOSPHATE 4 MG/ML
INJECTION, SOLUTION INTRA-ARTICULAR; INTRALESIONAL; INTRAMUSCULAR; INTRAVENOUS; SOFT TISSUE AS NEEDED
Status: DISCONTINUED | OUTPATIENT
Start: 2025-02-14 | End: 2025-02-14 | Stop reason: SURG

## 2025-02-14 RX ORDER — SODIUM CHLORIDE 0.9 % (FLUSH) 0.9 %
3-10 SYRINGE (ML) INJECTION AS NEEDED
Status: DISCONTINUED | OUTPATIENT
Start: 2025-02-14 | End: 2025-02-14 | Stop reason: HOSPADM

## 2025-02-14 RX ORDER — LABETALOL HYDROCHLORIDE 5 MG/ML
5 INJECTION, SOLUTION INTRAVENOUS
Status: DISCONTINUED | OUTPATIENT
Start: 2025-02-14 | End: 2025-02-14 | Stop reason: HOSPADM

## 2025-02-14 RX ORDER — FLUMAZENIL 0.1 MG/ML
0.2 INJECTION INTRAVENOUS AS NEEDED
Status: DISCONTINUED | OUTPATIENT
Start: 2025-02-14 | End: 2025-02-14 | Stop reason: HOSPADM

## 2025-02-14 RX ORDER — LIDOCAINE HYDROCHLORIDE 20 MG/ML
INJECTION, SOLUTION EPIDURAL; INFILTRATION; INTRACAUDAL; PERINEURAL AS NEEDED
Status: DISCONTINUED | OUTPATIENT
Start: 2025-02-14 | End: 2025-02-14 | Stop reason: SURG

## 2025-02-14 RX ORDER — PROMETHAZINE HYDROCHLORIDE 25 MG/1
25 SUPPOSITORY RECTAL ONCE AS NEEDED
Status: DISCONTINUED | OUTPATIENT
Start: 2025-02-14 | End: 2025-02-14 | Stop reason: HOSPADM

## 2025-02-14 RX ORDER — EPHEDRINE SULFATE 50 MG/ML
5 INJECTION, SOLUTION INTRAVENOUS ONCE AS NEEDED
Status: DISCONTINUED | OUTPATIENT
Start: 2025-02-14 | End: 2025-02-14 | Stop reason: HOSPADM

## 2025-02-14 RX ORDER — HYDRALAZINE HYDROCHLORIDE 20 MG/ML
5 INJECTION INTRAMUSCULAR; INTRAVENOUS
Status: DISCONTINUED | OUTPATIENT
Start: 2025-02-14 | End: 2025-02-14 | Stop reason: HOSPADM

## 2025-02-14 RX ORDER — FENTANYL CITRATE 50 UG/ML
50 INJECTION, SOLUTION INTRAMUSCULAR; INTRAVENOUS
Status: DISCONTINUED | OUTPATIENT
Start: 2025-02-14 | End: 2025-02-14 | Stop reason: HOSPADM

## 2025-02-14 RX ORDER — HYDROMORPHONE HYDROCHLORIDE 1 MG/ML
0.5 INJECTION, SOLUTION INTRAMUSCULAR; INTRAVENOUS; SUBCUTANEOUS
Status: DISCONTINUED | OUTPATIENT
Start: 2025-02-14 | End: 2025-02-14 | Stop reason: HOSPADM

## 2025-02-14 RX ORDER — PROMETHAZINE HYDROCHLORIDE 25 MG/1
25 TABLET ORAL ONCE AS NEEDED
Status: DISCONTINUED | OUTPATIENT
Start: 2025-02-14 | End: 2025-02-14 | Stop reason: HOSPADM

## 2025-02-14 RX ORDER — ROCURONIUM BROMIDE 10 MG/ML
INJECTION, SOLUTION INTRAVENOUS AS NEEDED
Status: DISCONTINUED | OUTPATIENT
Start: 2025-02-14 | End: 2025-02-14 | Stop reason: SURG

## 2025-02-14 RX ADMIN — HYDROMORPHONE HYDROCHLORIDE 0.5 MG: 1 INJECTION, SOLUTION INTRAMUSCULAR; INTRAVENOUS; SUBCUTANEOUS at 11:37

## 2025-02-14 RX ADMIN — HYDROCODONE BITARTRATE AND ACETAMINOPHEN 1 TABLET: 5; 325 TABLET ORAL at 10:59

## 2025-02-14 RX ADMIN — CEFAZOLIN 2000 MG: 2 INJECTION, POWDER, FOR SOLUTION INTRAMUSCULAR; INTRAVENOUS at 09:08

## 2025-02-14 RX ADMIN — LIDOCAINE HYDROCHLORIDE 50 MG: 20 INJECTION, SOLUTION EPIDURAL; INFILTRATION; INTRACAUDAL; PERINEURAL at 09:26

## 2025-02-14 RX ADMIN — SUGAMMADEX 200 MG: 100 INJECTION, SOLUTION INTRAVENOUS at 10:35

## 2025-02-14 RX ADMIN — HYDROMORPHONE HYDROCHLORIDE 0.5 MG: 1 INJECTION, SOLUTION INTRAMUSCULAR; INTRAVENOUS; SUBCUTANEOUS at 11:12

## 2025-02-14 RX ADMIN — ROCURONIUM BROMIDE 50 MG: 10 INJECTION, SOLUTION INTRAVENOUS at 09:27

## 2025-02-14 RX ADMIN — PROPOFOL 150 MG: 10 INJECTION, EMULSION INTRAVENOUS at 09:26

## 2025-02-14 RX ADMIN — SODIUM CHLORIDE, POTASSIUM CHLORIDE, SODIUM LACTATE AND CALCIUM CHLORIDE: 600; 310; 30; 20 INJECTION, SOLUTION INTRAVENOUS at 09:23

## 2025-02-14 RX ADMIN — SODIUM CHLORIDE, POTASSIUM CHLORIDE, SODIUM LACTATE AND CALCIUM CHLORIDE: 600; 310; 30; 20 INJECTION, SOLUTION INTRAVENOUS at 09:33

## 2025-02-14 RX ADMIN — ONDANSETRON 4 MG: 2 INJECTION, SOLUTION INTRAMUSCULAR; INTRAVENOUS at 10:32

## 2025-02-14 RX ADMIN — Medication 100 MCG: at 09:43

## 2025-02-14 RX ADMIN — FENTANYL CITRATE 50 MCG: 50 INJECTION, SOLUTION INTRAMUSCULAR; INTRAVENOUS at 09:47

## 2025-02-14 RX ADMIN — DEXAMETHASONE SODIUM PHOSPHATE 10 MG: 4 INJECTION, SOLUTION INTRAMUSCULAR; INTRAVENOUS at 09:50

## 2025-02-14 RX ADMIN — HYDROMORPHONE HYDROCHLORIDE 0.5 MG: 1 INJECTION, SOLUTION INTRAMUSCULAR; INTRAVENOUS; SUBCUTANEOUS at 10:41

## 2025-02-14 RX ADMIN — FENTANYL CITRATE 50 MCG: 50 INJECTION, SOLUTION INTRAMUSCULAR; INTRAVENOUS at 09:58

## 2025-02-14 NOTE — ANESTHESIA POSTPROCEDURE EVALUATION
Patient: Chaka Paul    Procedure Summary       Date: 02/14/25 Room / Location:  [facility] /  [facility]    Anesthesia Start: 0923 Anesthesia Stop: 1050    Procedure: Robotic right inguinal hernia repair with mesh (Right: Abdomen) Diagnosis:       Right inguinal hernia      (Right inguinal hernia [K40.90])    Surgeons: Lupe Pitts MD Provider: Ernie Aleman MD    Anesthesia Type: general ASA Status: 3            Anesthesia Type: general    Vitals  Vitals Value Taken Time   /72 02/14/25 1200   Temp 36.8 °C (98.2 °F) 02/14/25 1130   Pulse 63 02/14/25 1214   Resp 12 02/14/25 1130   SpO2 94 % 02/14/25 1214   Vitals shown include unfiled device data.        Post Anesthesia Care and Evaluation    Patient location during evaluation: bedside  Patient participation: complete - patient participated  Level of consciousness: awake and alert  Pain management: adequate    Airway patency: patent  Anesthetic complications: No anesthetic complications  PONV Status: controlled  Cardiovascular status: blood pressure returned to baseline and acceptable  Respiratory status: acceptable  Hydration status: acceptable

## 2025-02-14 NOTE — ANESTHESIA PREPROCEDURE EVALUATION
Anesthesia Evaluation     Patient summary reviewed and Nursing notes reviewed   no history of anesthetic complications:   NPO Solid Status: > 8 hours  NPO Liquid Status: > 2 hours           Airway   Mallampati: II  TM distance: >3 FB  Neck ROM: full  Dental    (+) poor dentition    Comment: Missing teeth    Pulmonary    (+) a smoker Former,  Cardiovascular     ECG reviewed    (+) hypertension, hyperlipidemia      Neuro/Psych  GI/Hepatic/Renal/Endo      Musculoskeletal     Abdominal    Substance History      OB/GYN          Other      history of cancer                  Anesthesia Plan    ASA 3     general     intravenous induction     Anesthetic plan, risks, benefits, and alternatives have been provided, discussed and informed consent has been obtained with: patient.      CODE STATUS:

## 2025-02-14 NOTE — OP NOTE
OPERATIVE REPORT    DATE: 2/14/2025    SURGEON: Lupe Pitts MD     ASSISTANT: Carina Le, who was present for necessary suctioning, retracting, suturing and camera holding throughout the procedure     OPERATION PERFORMED: Robotic right inguinal hernia repair with mesh    PREOPERATIVE DIAGNOSIS: Right inguinal hernia    POSTOPERATIVE DIAGNOSIS: Large right indirect inguinal hernia    ANESTHESIA: General    SPECIMEN: None    DRAINS: None    BLOOD LOSS: Minimal     INDICATION FOR OPERATION: Chaka Paul is a 79 y.o. who was recently seen in the office for a moderate to large size reducible right inguinal hernia causing pain and discomfort.  Robotic right inguinal hernia repair with mesh was recommended. All risks (including bleeding, infection, damage to surrounding structures, recurrence, mesh infection), benefits and alternatives were explained to the patient who agreed and wished to proceed. Informed consent was signed.     OPERATIVE COURSE: The patient was taken to the operating room, transferred onto the operating room table, and underwent general endotracheal anesthesia without incident. The patient was prepped and draped in sterile fashion. A time out was performed and preoperative antibiotics were given. Half percent Marcaine with epinephrine was injected into the skin and subcutaneous tissue.  An incision was made in the left upper quadrant Mathew's point.  The Optiview trocar with a 5 0 scope was inserted through each layer of the abdominal wall individually and into the abdomen.  The abdomen was insufflated.  The area was inspected and no injuries were noted from insertion.  An 8 mm epigastric trocar and an 8 mm right upper quadrant trocar were placed.  The 5 mm trocar was upsized to an 8 mm trocar.  The pelvis was examined.  There was a moderate to large sized right indirect inguinal hernia.  There was no left inguinal hernia..  Large 3D mesh was inserted into the abdomen as well as the suture.   The da Dagmar X Xi robot was then brought up and docked.  The monopolar scissors were introduced as well as the force bipolar under direct visualization.  The peritoneum was scored and inferior medial to the ASIS and incised medially to the level of the obliterated median umbilical vein.  The preperitoneal dissection was carried medially until Lewis's ligament was exposed.  There was some increased inflammatory tissue due to his prior prostatectomy.  I then created the lateral space with blunt dissection.  Cord structures were identified and a large hernia sac was peeled from the structures to allow for mesh placement.  Once this was done the large mid weight Bard 3D mesh was introduced and oriented appropriately.  It was secured to Lewis's ligament with two 2-0 Vicryl sutures and then along the anterior abdominal wall medial and laterally to the epigastric vessels with a 2-0 Vicryl suture.  The peritoneal flap was lifted and the mesh lay in good position covering potential hernia defects.  The flap was closed with a running barbed 2-0 Monocryl suture.  The needles were removed.  Robot was undocked.  The abdomen was desufflated.  The incisions were closed with 4-0 Monocryl suture and skin glue.  The patient tolerated the procedure well. All needle and lap counts were correct at the end of the case. The patient was then awoken from anesthesia and taken to recovery for further monitoring.     Lupe Pitts MD   General and Endoscopic Surgery  [facility]    [address], [address]  [place], [place], [address]  P: [phone]  F: [phone]

## 2025-02-26 ENCOUNTER — OFFICE VISIT (OUTPATIENT)
Dept: SURGERY | Facility: CLINIC | Age: 80
End: 2025-02-26
Payer: MEDICARE

## 2025-02-26 VITALS
HEART RATE: 63 BPM | HEIGHT: 67 IN | SYSTOLIC BLOOD PRESSURE: 150 MMHG | WEIGHT: 134.4 LBS | BODY MASS INDEX: 21.09 KG/M2 | OXYGEN SATURATION: 99 % | DIASTOLIC BLOOD PRESSURE: 84 MMHG

## 2025-02-26 DIAGNOSIS — K40.90 RIGHT INGUINAL HERNIA: Primary | ICD-10-CM

## 2025-02-26 PROCEDURE — 1160F RVW MEDS BY RX/DR IN RCRD: CPT | Performed by: STUDENT IN AN ORGANIZED HEALTH CARE EDUCATION/TRAINING PROGRAM

## 2025-02-26 PROCEDURE — 99024 POSTOP FOLLOW-UP VISIT: CPT | Performed by: STUDENT IN AN ORGANIZED HEALTH CARE EDUCATION/TRAINING PROGRAM

## 2025-02-26 PROCEDURE — 1159F MED LIST DOCD IN RCRD: CPT | Performed by: STUDENT IN AN ORGANIZED HEALTH CARE EDUCATION/TRAINING PROGRAM

## 2025-02-26 RX ORDER — MULTIPLE VITAMINS W/ MINERALS TAB 9MG-400MCG
1 TAB ORAL DAILY
COMMUNITY

## 2025-02-26 NOTE — PROGRESS NOTES
General Surgery Post-Operative Follow Up Note     Summary:    Chaka Paul is a 79 y.o. year old who presents for post-operative follow up from robotic right inguinal hernia repair with mesh. Doing well with no acute issues, follow up as needed.      History of Present Illness:    Chaka Paul is a 79 y.o. year old who presents for post-operative follow up.  He is done well since surgery.  His pain is controlled and he is tolerating a regular diet.  He has no swelling.    Procedure:    2/14/2025 Robotic right inguinal hernia repair with mesh     Physical Exam:   Constitutional: Well-developed well-nourished, no acute distress  Eyes: Conjunctiva normal, sclera nonicteric  ENMT: Hearing grossly normal, oral mucosa moist  Respiratory: No increased work of breathing, normal inspiratory effort  Cardiovascular: Regular rate, no peripheral edema, no jugular venous distention  Gastrointestinal: Soft, nontender, incisions clean and dry  Skin:  Warm, dry, no rash on visualized skin surfaces  Musculoskeletal: Symmetric strength, normal gait  Psychiatric: Alert and oriented ×3, normal affect     Lupe Pitts M.D.  General and Endoscopic Surgery  [facility]    [address], [address]  [place], [place], [address]  P: [phone]  F: [phone]

## (undated) DEVICE — LAPAROVUE VISIBILITY SYSTEM LAPAROSCOPIC SOLUTIONS: Brand: LAPAROVUE

## (undated) DEVICE — GLV SURG BIOGEL LTX PF 6 1/2

## (undated) DEVICE — ANTIBACTERIAL UNDYED BRAIDED (POLYGLACTIN 910), SYNTHETIC ABSORBABLE SURGICAL SUTURE: Brand: COATED VICRYL

## (undated) DEVICE — ADAPT CLN BIOGUARD AIR/H2O DISP

## (undated) DEVICE — BLADELESS OBTURATOR: Brand: WECK VISTA

## (undated) DEVICE — OSC GEN LAPAROSCOPY: Brand: MEDLINE INDUSTRIES, INC.

## (undated) DEVICE — CANN O2 ETCO2 FITS ALL CONN CO2 SMPL A/ 7IN DISP LF

## (undated) DEVICE — KT ORCA ORCAPOD DISP STRL

## (undated) DEVICE — TUBING, SUCTION, 1/4" X 10', STRAIGHT: Brand: MEDLINE

## (undated) DEVICE — EXOFIN PRECISION PEN HIGH VISCOSITY TOPICAL SKIN ADHESIVE: Brand: EXOFIN PRECISION PEN, 1G

## (undated) DEVICE — APPL CHLORAPREP HI/LITE 26ML ORNG

## (undated) DEVICE — COLUMN DRAPE

## (undated) DEVICE — VIOLET BRAIDED (POLYGLACTIN 910), SYNTHETIC ABSORBABLE SUTURE: Brand: COATED VICRYL

## (undated) DEVICE — ENDOPATH XCEL BLADELESS TROCARS WITH STABILITY SLEEVES: Brand: ENDOPATH XCEL

## (undated) DEVICE — ARM DRAPE

## (undated) DEVICE — SUT MNCRYL PLS ANTIB UD 4/0 PS2 18IN

## (undated) DEVICE — LN SMPL CO2 SHTRM SD STREAM W/M LUER

## (undated) DEVICE — COVER,MAYO STAND,STERILE: Brand: MEDLINE

## (undated) DEVICE — SEAL

## (undated) DEVICE — SENSR O2 OXIMAX FNGR A/ 18IN NONSTR

## (undated) DEVICE — PATIENT RETURN ELECTRODE, SINGLE-USE, CONTACT QUALITY MONITORING, ADULT, WITH 9FT CORD, FOR PATIENTS WEIGING OVER 33LBS. (15KG): Brand: MEGADYNE

## (undated) DEVICE — ST TBG AIRSEAL FLTR SMOKE BIF

## (undated) DEVICE — TIP COVER ACCESSORY